# Patient Record
Sex: FEMALE | Race: WHITE | NOT HISPANIC OR LATINO
[De-identification: names, ages, dates, MRNs, and addresses within clinical notes are randomized per-mention and may not be internally consistent; named-entity substitution may affect disease eponyms.]

---

## 2019-09-23 PROBLEM — Z00.00 ENCOUNTER FOR PREVENTIVE HEALTH EXAMINATION: Status: ACTIVE | Noted: 2019-09-23

## 2019-10-02 ENCOUNTER — APPOINTMENT (OUTPATIENT)
Dept: NEUROLOGY | Facility: CLINIC | Age: 54
End: 2019-10-02
Payer: MEDICARE

## 2019-10-02 VITALS
DIASTOLIC BLOOD PRESSURE: 69 MMHG | HEIGHT: 65 IN | WEIGHT: 140 LBS | BODY MASS INDEX: 23.32 KG/M2 | HEART RATE: 59 BPM | TEMPERATURE: 98 F | SYSTOLIC BLOOD PRESSURE: 115 MMHG

## 2019-10-02 DIAGNOSIS — Z82.0 FAMILY HISTORY OF EPILEPSY AND OTHER DISEASES OF THE NERVOUS SYSTEM: ICD-10-CM

## 2019-10-02 DIAGNOSIS — Z83.3 FAMILY HISTORY OF DIABETES MELLITUS: ICD-10-CM

## 2019-10-02 DIAGNOSIS — Z83.79 FAMILY HISTORY OF OTHER DISEASES OF THE DIGESTIVE SYSTEM: ICD-10-CM

## 2019-10-02 DIAGNOSIS — Z82.49 FAMILY HISTORY OF ISCHEMIC HEART DISEASE AND OTHER DISEASES OF THE CIRCULATORY SYSTEM: ICD-10-CM

## 2019-10-02 DIAGNOSIS — Z87.820 PERSONAL HISTORY OF TRAUMATIC BRAIN INJURY: ICD-10-CM

## 2019-10-02 DIAGNOSIS — Z78.9 OTHER SPECIFIED HEALTH STATUS: ICD-10-CM

## 2019-10-02 PROCEDURE — 99205 OFFICE O/P NEW HI 60 MIN: CPT

## 2019-10-02 RX ORDER — B-COMPLEX WITH VITAMIN C
TABLET ORAL
Refills: 0 | Status: ACTIVE | COMMUNITY

## 2019-10-02 RX ORDER — MULTIVITAMIN
TABLET ORAL
Refills: 0 | Status: ACTIVE | COMMUNITY

## 2019-10-02 NOTE — REVIEW OF SYSTEMS
[Dizziness] : dizziness [Tension Headache] : tension-type headaches [Anxiety] : anxiety [Depression] : depression [Diarrhea] : diarrhea [Heartburn] : heartburn [Joint Pain] : joint pain [Negative] : Endocrine [FreeTextEntry2] : weakness,night time sweats [de-identified] : frequent falls,off balance [FreeTextEntry5] : leg swelling  [FreeTextEntry8] : frequent urination,hot flashes [FreeTextEntry9] : muscle aches

## 2019-10-02 NOTE — PHYSICAL EXAM
[FreeTextEntry1] : Physical examination \par General: No acute distress, Awake, Alert.   \par Fundus: disc margins sharp.   \par Neck: no Carotid bruit.   \par Cardiovascular: Normal rate, Regular rhythm, No murmur.  \par \par Mental status \par MoCA\par October 2, 2019 - 21/30; memory 2/5\par \par \par Cranial Nerves \par II: VFF  \par III, IV, VI: PERRL, EOMI.   \par V: Facial sensation is normal B/L.   \par VII: Facial strength is normal B/L. \par VIII: Gross hearing is intact.   \par IX, X: Palate is midline and elevates symmetrically.   \par XI: Trapezius normal strength.   \par XII: Tongue midline without atrophy or fasciculations. \par \par Motor exam  \par Muscle tone - spasticity in the right arm and leg.  Very slow and clumsy rapid successive movements in the right hand.\par No atrophy or fasciculations \par Muscle Strength: arms and legs, proximal and distal flexors and extensors are normal except for right: Finger extension 4-; ankle and toe movement 0; others on the right 4—4+\par \par \par Reflexes \par Right: 3, brachioradialis and biceps with spread to the finger flexors.\par Left 2\par Plantars right: Upgoing.   \par Plantars left: mute.   \par \par Coordination \par Finger to nose: Normal.  \par Heel to shin: Normal.   \par \par Slow, no ataxia - FNF, EDDIE, HKS on the right. \par \par Sensory \par Intact sensation to vibration and cold.\par \par Gait \par Right spastic, hemiparetic gait. Slow, tentative. \par

## 2019-10-02 NOTE — HISTORY OF PRESENT ILLNESS
[FreeTextEntry1] : Ms. Magallon is a 54 year old woman with a history of cerebral palsy with baseline mild cognitive impairment and spastic right hemiparesis. She has noticed gradually worsening cognitive and memory problems for the past 4-5 years. At the current time she forgets things that she was told 5 minutes before. She works as a  at “[employer]" and is able to perform her duties there although her manager has noticed the memory difficulty.  She has always had right-sided weakness and stiffness but there has been an increased amount of stiffness in the right side more recently. She takes baclofen 10 mg t.i.d. Her right foot is dragging and she sometimes trips on her toes on the right. She has had multiple falls. She has no history of epilepsy. She has a history of headache which was very well controlled on Topamax 75 mg at night. More recently the headaches have increased in frequency again.  Her mother has a history of cerebral aneurysm.

## 2019-10-02 NOTE — ASSESSMENT
[FreeTextEntry1] : Ms. Magallon is a 54 year old woman with a history of cerebral palsy with several issues.\par \par History of cerebral palsy with spastic right hemiparesis. She has had increased spasticity in the right arm or leg. I recommend a trial of increasing the dose of baclofen to 15 mg t.i.d.  She has had increased difficulty with her gait and has fallen. Physical medicine and rehabilitation consultation and physical therapy for gait and balance training and fall prevention.  I encouraged her to use an assistive device, at least a cane.\par \par Family history of cerebral aneurysm - MRA had.\par \par History of headache which was well controlled in the past on Topamax - now with increased severity and frequency of headaches. Increase Topamax from 75 to 100 mg daily in the evening.\par \par She has always had some mild cognitive impairment but her memory has worsened over the past 4-5 years.\par MRI brain\par Neuropsychological evaluation\par Vitamin B12 level.\par \par

## 2019-10-02 NOTE — REASON FOR VISIT
[Acute] : an acute visit [FreeTextEntry1] : Cerebral palsy,headache,history of concussions,memory problem,off balance,she has fallen

## 2019-10-02 NOTE — CONSULT LETTER
[Dear  ___] : Dear  [unfilled], [FreeTextEntry1] : I had the pleasure of evaluating your patient, ROSA ACHARYA. Please see the assessment section below for a summary of my diagnostic impression and plan.\par \par Thank you very much for allowing me to participate in the care of this patient. If you have any questions, please do not hesitate to contact me. \par \par Sincerely,\par \par Deneen Ledesma MD\par

## 2019-10-30 ENCOUNTER — RESULT REVIEW (OUTPATIENT)
Age: 54
End: 2019-10-30

## 2019-11-01 ENCOUNTER — RESULT REVIEW (OUTPATIENT)
Age: 54
End: 2019-11-01

## 2020-01-07 ENCOUNTER — APPOINTMENT (OUTPATIENT)
Dept: NEUROLOGY | Facility: CLINIC | Age: 55
End: 2020-01-07

## 2020-02-20 ENCOUNTER — APPOINTMENT (OUTPATIENT)
Dept: NEUROLOGY | Facility: CLINIC | Age: 55
End: 2020-02-20
Payer: MEDICARE

## 2020-02-20 PROCEDURE — 95819 EEG AWAKE AND ASLEEP: CPT

## 2020-02-21 PROCEDURE — 95719 EEG PHYS/QHP EA INCR W/O VID: CPT

## 2020-02-21 PROCEDURE — 95708 EEG WO VID EA 12-26HR UNMNTR: CPT

## 2020-02-21 PROCEDURE — 95700 EEG CONT REC W/VID EEG TECH: CPT

## 2020-02-25 ENCOUNTER — APPOINTMENT (OUTPATIENT)
Dept: NEUROLOGY | Facility: CLINIC | Age: 55
End: 2020-02-25

## 2020-03-06 ENCOUNTER — APPOINTMENT (OUTPATIENT)
Dept: NEUROLOGY | Facility: CLINIC | Age: 55
End: 2020-03-06
Payer: MEDICARE

## 2020-03-06 VITALS
BODY MASS INDEX: 23.32 KG/M2 | HEIGHT: 65 IN | WEIGHT: 140 LBS | DIASTOLIC BLOOD PRESSURE: 43 MMHG | SYSTOLIC BLOOD PRESSURE: 87 MMHG | HEART RATE: 76 BPM

## 2020-03-06 PROCEDURE — 99214 OFFICE O/P EST MOD 30 MIN: CPT

## 2020-03-06 NOTE — REVIEW OF SYSTEMS
[Dizziness] : dizziness [Tension Headache] : tension-type headaches [Anxiety] : anxiety [Depression] : depression [Diarrhea] : diarrhea [Heartburn] : heartburn [Joint Pain] : joint pain [Negative] : Heme/Lymph [FreeTextEntry2] : weakness,night time sweats [de-identified] : frequent falls,off balance [FreeTextEntry5] : leg swelling  [FreeTextEntry8] : frequent urination,hot flashes [FreeTextEntry9] : muscle aches

## 2020-03-06 NOTE — CONSULT LETTER
[Dear  ___] : Dear  [unfilled], [FreeTextEntry1] : I had the pleasure of evaluating your patient, ROSA ACHARYA, Please see the assessment section below for a summary of my diagnostic impression and plan.\par \par Thank you very much for allowing me to participate in the care of this patient. If you have any questions, please do not hesitate to contact me.\par \par Sincerely,\par \par Deneen Ledesma MD\par \par \par

## 2020-03-06 NOTE — ASSESSMENT
[FreeTextEntry1] : Ms. Magallon is a 54 year old woman with a history of cerebral palsy with several issues.\par \par History of cerebral palsy with spastic right hemiparesis. She has had spasticity in the right arm and leg. Continue baclofen 15 mg t.i.d.  She has had increased difficulty with her gait and has fallen. Physical medicine and rehabilitation consultation and physical therapy for gait and balance training and fall prevention.  I encouraged her to use an assistive device, at least a cane.\par \par Family history of cerebral aneurysm - no aneurysm on MRA head in October 2019.\par \par History of headache - continue Topamax 100 mg daily in the evening.\par \par She has always had some mild cognitive impairment but her memory has worsened over the past 4-5 years.\par Neuropsychological evaluation\par Vitamin B12 level.\par \par MRI brain - Prominent pituitary gland. She saw an endocrinologist; MRI pituitary\par \par D/W patient \par

## 2020-03-06 NOTE — DATA REVIEWED
[de-identified] : MRI brain reviewed\par MRA head - no aneurysm [de-identified] : Feb 2020 - Routine EEG & Ambulatory EEG reviewed

## 2020-03-06 NOTE — HISTORY OF PRESENT ILLNESS
[FreeTextEntry1] : This is a followup visit for Ms. Magallon. She continues to have lightheadedness. She is worsening stiffness in her right side with multiple falls. She used to have a power chair. Now she is not using a cane or walker. No change in any of her other symptoms\par \par Initial consultation:\par Ms. Magallon is a 54 year old woman with a history of cerebral palsy with baseline mild cognitive impairment and spastic right hemiparesis. She has noticed gradually worsening cognitive and memory problems for the past 4-5 years. At the current time she forgets things that she was told 5 minutes before. She works as a  at “[employer]" and is able to perform her duties there although her manager has noticed the memory difficulty.  She has always had right-sided weakness and stiffness but there has been an increased amount of stiffness in the right side more recently. She takes baclofen 10 mg t.i.d. Her right foot is dragging and she sometimes trips on her toes on the right. She has had multiple falls. She has no history of epilepsy. She has a history of headache which was very well controlled on Topamax 75 mg at night. More recently the headaches have increased in frequency again.  Her mother has a history of cerebral aneurysm.

## 2020-03-06 NOTE — PHYSICAL EXAM
[FreeTextEntry1] : Physical examination \par \par \par Mental status \par Awake, alert, appropriate.  Gives detailed history.\par \par MoCA\par October 2, 2019 - 21/30; memory 2/5\par  \par \par Motor exam  \par Muscle tone - spasticity in the right arm and leg.  Very slow and clumsy rapid successive movements in the right hand.\par No atrophy or fasciculations \par Muscle Strength: arms and legs, proximal and distal flexors and extensors are normal except for right: Finger extension 4-; ankle and toe movement 0; others on the right 4—>4+\par \par \par Reflexes \par Right: 3, brachioradialis and biceps with spread to the finger flexors.\par Left 2\par Plantars right: Upgoing.   \par Plantars left: mute.   \par \par \par Slow, no ataxia - FNF, EDDIE, HKS on the right. \par \par Gait \par Right spastic, hemiparetic gait. Slow, tentative. \par

## 2020-03-23 ENCOUNTER — NON-APPOINTMENT (OUTPATIENT)
Age: 55
End: 2020-03-23

## 2020-06-30 ENCOUNTER — RESULT REVIEW (OUTPATIENT)
Age: 55
End: 2020-06-30

## 2020-07-27 ENCOUNTER — APPOINTMENT (OUTPATIENT)
Dept: NEUROLOGY | Facility: CLINIC | Age: 55
End: 2020-07-27
Payer: MEDICARE

## 2020-07-27 VITALS
DIASTOLIC BLOOD PRESSURE: 57 MMHG | BODY MASS INDEX: 24.49 KG/M2 | SYSTOLIC BLOOD PRESSURE: 101 MMHG | HEIGHT: 65 IN | WEIGHT: 147 LBS | HEART RATE: 78 BPM

## 2020-07-27 DIAGNOSIS — R32 UNSPECIFIED URINARY INCONTINENCE: ICD-10-CM

## 2020-07-27 DIAGNOSIS — R15.9 FULL INCONTINENCE OF FECES: ICD-10-CM

## 2020-07-27 PROCEDURE — 99214 OFFICE O/P EST MOD 30 MIN: CPT

## 2020-07-27 RX ORDER — TOPIRAMATE 100 MG/1
100 TABLET, FILM COATED ORAL
Qty: 90 | Refills: 3 | Status: DISCONTINUED | COMMUNITY
Start: 2019-10-02 | End: 2020-07-27

## 2020-07-27 NOTE — DATA REVIEWED
[de-identified] : MRI brain reviewed\par MRA head - no aneurysm [de-identified] : Feb 2020 - Routine EEG & Ambulatory EEG reviewed

## 2020-07-27 NOTE — REASON FOR VISIT
[Follow-Up: _____] : a [unfilled] follow-up visit [FreeTextEntry1] : Balance issues, headaches, pain in right leg.

## 2020-07-27 NOTE — HISTORY OF PRESENT ILLNESS
[FreeTextEntry1] : Kavita Magallon is a 55 year old woman with cerebral palsy. She presents today for worsening balance and stiffness in the right leg, as well as headaches.\par \par She currently lives on the third floor with no elevator.  She does not use a cane or assistive device to help her walk. Over the past four months, Ms. Magallon reports a change in balance, increase in difficulty walking and stiffness to her right leg.  She is currently taking Baclofen 10mg three times per day with no side effects. Baclofen 15mg three times per day was recommended on her last visit.  There is pain in her right leg from the hip throughout the whole leg. She reports the pain is difficult to describe but it feels as "if somebody is hitting her leg."  \par \par Ms. Magallon also reports a worsening of her preexisting headaches. She was on Topamax 100mg nightly that was tapered down by her primary doctor - currently, she reports taking 12.5mg of Topamax nightly.  She is not certain of the increased in the amount of frequency but is aware that the headaches increased overall.  The pain starts in the occipital area and travels to the bilateral frontal area of her head.  She has sound sensitivity and describes it as a "device" on her head.  She denies photophobia, nausea, vomiting.  Ms. Magallon has tried Ibuprofen and Naproxen in the past with no relief. She was given butalbital by another physician.  This has not provided her with any relief.  \par \par She reports intermittent urinary and fecal incontinence for at least one year. \par \par Ms. Magallon also reports worsening memory over 4-5 years.  She was unable to do neuropsychological testing due to insurance issues.  \par \par The remaining neurological review of systems is negative. \par \par 3/6/20: This is a followup visit for Ms. Magallon. She continues to have lightheadedness. She is worsening stiffness in her right side with multiple falls. She used to have a power chair. Now she is not using a cane or walker. No change in any of her other symptoms\par \par Initial consultation:\par Ms. Magallon is a 54 year old woman with a history of cerebral palsy with baseline mild cognitive impairment and spastic right hemiparesis. She has noticed gradually worsening cognitive and memory problems for the past 4-5 years. At the current time she forgets things that she was told 5 minutes before. She works as a  at “[employer]" and is able to perform her duties there although her manager has noticed the memory difficulty.  She has always had right-sided weakness and stiffness but there has been an increased amount of stiffness in the right side more recently. She takes baclofen 10 mg t.i.d. Her right foot is dragging and she sometimes trips on her toes on the right. She has had multiple falls. She has no history of epilepsy. She has a history of headache which was very well controlled on Topamax 75 mg at night. More recently the headaches have increased in frequency again.  Her mother has a history of cerebral aneurysm.

## 2020-07-27 NOTE — PHYSICAL EXAM
[FreeTextEntry1] : Physical examination \par \par \par Mental status \par Awake, alert, appropriate.  Gives detailed history.\par \par MoCA\par October 2, 2019 - 21/30; memory 2/5\par  \par \par Motor exam  \par Muscle tone - spasticity in the right arm and leg.  Very slow and clumsy rapid successive movements in the right hand.\par No atrophy or fasciculations \par Muscle Strength: arms and legs, proximal and distal flexors and extensors are normal except for right: Finger extension 4-; ankle and toe movement 0; others on the right 4—>4+\par \par \par Reflexes \par Right: 3, brachioradialis and biceps with spread to the finger flexors.\par Right patellar 4+\par Right achilles 4+\par Left 2\par \par Plantars right: Upgoing.   \par Plantars left: mute.   \par \par \par Slow, no ataxia - FNF, EDDIE, HKS on the right. \par \par Gait \par Right spastic, hemiparetic gait. Slow, tentative. \par

## 2020-07-27 NOTE — ASSESSMENT
[FreeTextEntry1] : Ms. Magallon is a 55 year old woman with a history of cerebral palsy with several issues.\par \par History of cerebral palsy with spastic right hemiparesis.\par  She has had increased spasticity in the right arm and leg.\par  Increase to baclofen 15 mg t.i.d for two weeks then increase to Baclofen 20mg TID.\par \par  She has had increased difficulty with her gait and has fallen. Physical medicine and rehabilitation consultation and physical therapy for gait and balance training and fall prevention. \par  I encouraged her to use an assistive device, at least a cane.\par \par Family history of cerebral aneurysm - no aneurysm on MRA head in October 2019.\par \par Increased frequency of migraines - stop Topamax and Butabitol. Start Imitrex 100mg PRN.  On next visit, we will discuss preventative medications.  \par \par She has always had some mild cognitive impairment but her memory has worsened over the past 4-5 years.\par Neuropsychological evaluation.\par Vitamin B12 level.\par \par MRI - Prominent pituitary gland with increased size differential diagnosis includes pituitary adenoma. She saw an endocrinologist; referral to neurosurgery. \par \par Intermittent urinary and fecal incontinence for at least one year - urogynecology consult. \par \par \par Follow up in one month.

## 2020-08-12 ENCOUNTER — RESULT REVIEW (OUTPATIENT)
Age: 55
End: 2020-08-12

## 2020-09-01 ENCOUNTER — APPOINTMENT (OUTPATIENT)
Dept: NEUROLOGY | Facility: CLINIC | Age: 55
End: 2020-09-01
Payer: MEDICARE

## 2020-09-01 VITALS
SYSTOLIC BLOOD PRESSURE: 94 MMHG | HEART RATE: 78 BPM | HEIGHT: 65 IN | DIASTOLIC BLOOD PRESSURE: 63 MMHG | WEIGHT: 144 LBS | BODY MASS INDEX: 23.99 KG/M2 | TEMPERATURE: 95.3 F

## 2020-09-01 DIAGNOSIS — R51 HEADACHE: ICD-10-CM

## 2020-09-01 PROCEDURE — 99214 OFFICE O/P EST MOD 30 MIN: CPT

## 2020-09-01 RX ORDER — SUMATRIPTAN 100 MG/1
100 TABLET, FILM COATED ORAL
Qty: 9 | Refills: 5 | Status: DISCONTINUED | COMMUNITY
Start: 2020-07-27 | End: 2020-09-01

## 2020-09-01 NOTE — PHYSICAL EXAM
[FreeTextEntry1] : Physical examination \par \par \par Mental status \par Awake, alert, appropriate.  Gives detailed history.\par \par MoCA\par October 2, 2019 - 21/30; memory 2/5\par  unable to complete MOCA today due to headache. \par \par Motor exam  \par Muscle tone - spasticity in the right arm and leg.  Very slow and clumsy rapid successive movements in the right hand.\par No atrophy or fasciculations \par Muscle Strength: arms and legs, proximal and distal flexors and extensors are normal except for right: Finger extension 4-; ankle and toe movement 0; others on the right 4—>4+\par \par \par Reflexes \par Right: 3, brachioradialis and biceps with spread to the finger flexors.\par Right patellar 4+\par Right achilles 4+\par Left 2\par \par Plantars right: Upgoing.   \par Plantars left: mute.   \par \par \par Slow, no ataxia - FNF, EDDIE, HKS on the right. \par \par Gait \par Right spastic, hemiparetic gait. Slow, tentative. \par

## 2020-09-01 NOTE — HISTORY OF PRESENT ILLNESS
[FreeTextEntry1] : Kavita Magallon is a 55 year old woman with cerebral palsy presenting for a follow up for worsening balance and headaches. \par \par She has had headaches five times per week that last all day. She reports the Imitrex helps but it makes her feel fatigued and sleepy.   Ms. Magallno endorses that the pain starts in the occipital and neck area and radiating to the frontal and bitemporal. It is 10/10 on a pain scale and feels as if  a "knife is through her temple."\par \par She also reports her balance is worse. Physical therapy was ordered for her previously and she starts it on September 9th.  She feels dizzy and lightheaded at times. There have been no falls or injuries. She has 36 stairs in her apartment and plans on seeing rehab medicine on September 10th.  \par She does not go outside of the house much for a fear of falling.  \par \par Ms. Magallon has had nausea for 4-5 days constant with no change in medication. She has not followed up with her PCP yet for this. \par \par Ms. Magallon has not followed up with the behavioral health center yet for increased stress and anxiety.\par \par Currently, she is on baclofen 15mg three times per day. Her spasms have improved on the right side. She denies any side effects with the baclofen. \par \par She reports ongoing difficulty with her memory and has a planned neuropsychological evaluation. She did not get her B12 level done yet. \par \par The remaining neurological review of systems is negative. \par \par 7/27/20\par Kavita Magallon is a 55 year old woman with cerebral palsy. She presents today for worsening balance and stiffness in the right leg, as well as headaches.\par \par She currently lives on the third floor with no elevator.  She does not use a cane or assistive device to help her walk. Over the past four months, Ms. Magallon reports a change in balance, increase in difficulty walking and stiffness to her right leg.  She is currently taking Baclofen 10mg three times per day with no side effects. Baclofen 15mg three times per day was recommended on her last visit.  There is pain in her right leg from the hip throughout the whole leg. She reports the pain is difficult to describe but it feels as "if somebody is hitting her leg."  \par \par Ms. Magallon also reports a worsening of her preexisting headaches. She was on Topamax 100mg nightly that was tapered down by her primary doctor - currently, she reports taking 12.5mg of Topamax nightly.  She is not certain of the increased in the amount of frequency but is aware that the headaches increased overall.  The pain starts in the occipital area and travels to the bilateral frontal area of her head.  She has sound sensitivity and describes it as a "device" on her head.  She denies photophobia, nausea, vomiting.  Ms. Magallon has tried Ibuprofen and Naproxen in the past with no relief. She was given butalbital by another physician.  This has not provided her with any relief.  \par \par She reports intermittent urinary and fecal incontinence for at least one year. \par \par Ms. Magallon also reports worsening memory over 4-5 years.  She was unable to do neuropsychological testing due to insurance issues.  \par \par The remaining neurological review of systems is negative. \par \par 3/6/20: This is a followup visit for Ms. Magallon. She continues to have lightheadedness. She is worsening stiffness in her right side with multiple falls. She used to have a power chair. Now she is not using a cane or walker. No change in any of her other symptoms\par \par Initial consultation:\par Ms. Magallon is a 54 year old woman with a history of cerebral palsy with baseline mild cognitive impairment and spastic right hemiparesis. She has noticed gradually worsening cognitive and memory problems for the past 4-5 years. At the current time she forgets things that she was told 5 minutes before. She works as a  at “[employer]" and is able to perform her duties there although her manager has noticed the memory difficulty.  She has always had right-sided weakness and stiffness but there has been an increased amount of stiffness in the right side more recently. She takes baclofen 10 mg t.i.d. Her right foot is dragging and she sometimes trips on her toes on the right. She has had multiple falls. She has no history of epilepsy. She has a history of headache which was very well controlled on Topamax 75 mg at night. More recently the headaches have increased in frequency again.  Her mother has a history of cerebral aneurysm.

## 2020-09-01 NOTE — DATA REVIEWED
[de-identified] : MRI brain reviewed\par MRA head - no aneurysm [de-identified] : Feb 2020 - Routine EEG & Ambulatory EEG reviewed

## 2020-09-01 NOTE — ASSESSMENT
[FreeTextEntry1] : Ms. Magallon is a 55 year old woman with a history of cerebral palsy with several issues.\par \par History of cerebral palsy with spastic right hemiparesis.\par She has had increased spasticity in the right arm and leg which has improved with baclofen 15 mg TID but still present - increase baclofen to 20mg three times per day.\par \par Increased frequency of migraines - Topamax and Butabitol stopped in the past. Unable to tolerate Imitrex.  \par Trial of Frova 2.5 mg PRN and ondansetron. On next visit, we will discuss preventative medications. \par \par cervicogenic component of headache- pain management referral.\par \par Nausea- follow up with PCP. PRN Zofran with migraine \par \par  She has had increased difficulty with her gait and has fallen. Physical medicine and rehabilitation consultation and physical therapy for gait and balance training and fall prevention. \par  I encouraged her to use an assistive device, at least a cane.\par MRI Cervical spine without contrast to rule out a cervical spondylotic myelopathy \par \par Family history of cerebral aneurysm - no aneurysm on MRA head in October 2019.\par \par She has always had some mild cognitive impairment but her memory has worsened over the past 4-5 years.\par Neuropsychological evaluation.\par Vitamin B12 level.\par \par MRI - Prominent pituitary gland with increased size differential diagnosis includes pituitary adenoma. She saw an endocrinologist; referral to neurosurgery. \par \par Intermittent urinary and fecal incontinence for at least one year - urogynecology consult on last visit. \par \par Follow up in 6 weeks. \par

## 2020-09-09 ENCOUNTER — APPOINTMENT (OUTPATIENT)
Dept: NEUROSURGERY | Facility: CLINIC | Age: 55
End: 2020-09-09
Payer: MEDICARE

## 2020-09-09 DIAGNOSIS — R52 PAIN, UNSPECIFIED: ICD-10-CM

## 2020-09-10 ENCOUNTER — APPOINTMENT (OUTPATIENT)
Dept: PHYSICAL MEDICINE AND REHAB | Facility: CLINIC | Age: 55
End: 2020-09-10
Payer: MEDICARE

## 2020-09-10 VITALS
HEIGHT: 65 IN | RESPIRATION RATE: 16 BRPM | BODY MASS INDEX: 23.99 KG/M2 | HEART RATE: 67 BPM | SYSTOLIC BLOOD PRESSURE: 100 MMHG | DIASTOLIC BLOOD PRESSURE: 66 MMHG | WEIGHT: 144 LBS | TEMPERATURE: 97.6 F | OXYGEN SATURATION: 98 %

## 2020-09-10 PROCEDURE — 99204 OFFICE O/P NEW MOD 45 MIN: CPT

## 2020-09-13 PROBLEM — R52 PAIN OF RIGHT SIDE OF BODY: Status: RESOLVED | Noted: 2020-09-10 | Resolved: 2020-09-13

## 2020-09-13 NOTE — REVIEW OF SYSTEMS
[Hearing Loss] : loss of hearing [Abdominal Pain] : abdominal pain [Nausea] : nausea [Incontinence] : incontinence [Joint Stiffness] : joint stiffness [Muscle Pain] : muscle pain [Muscle Weakness] : muscle weakness [Headache] : headache [Difficulty Walking] : difficulty walking [Anxiety] : anxiety [Depression] : depression [Easy Bruising] : a tendency for easy bruising

## 2020-09-13 NOTE — SOCIAL HISTORY
[Spouse/Partner] : spouse/partner [Apartment] : in an apartment [Stairs inside the home?] : stairs inside the home [No Device Needed] : Patient doesn't use a device for ambulation

## 2020-09-14 NOTE — REVIEW OF SYSTEMS
[Eye Pain] : no eye pain [Chills] : no chills [Fever] : no fever [Palpitations] : no palpitations [Chest Pain] : no chest pain [Shortness Of Breath] : no shortness of breath [Skin Rash] : no skin rash [Cough] : no cough [Constipation] : no constipation [FreeTextEntry7] : nausea for past week

## 2020-09-14 NOTE — HISTORY OF PRESENT ILLNESS
[FreeTextEntry1] : Pt. is a 55 y.o. female with h/o CP who is referred from Dr. Ledesma for physiatry evaluation for her gait and multiple falls.  Pt. c/o right side weakness and right hip-->ankle pain from 3-6/10.  Her walking has worsened for over a year.  She uses no AD.  She falls about 1x/week.  She needs variable help with her ADL.  Pt. has started outpatient PT.  She was referred to see a neuropsychologist but hasn't gone to one yet.  She lives in a 3rd floor walkup (30 steps) with her fiance; she says he does not want to move.  Her Baclofen was recently increased for her spasms, and she also smoke marijuana for her spasms.

## 2020-09-14 NOTE — PHYSICAL EXAM
[FreeTextEntry1] : Gen: WD in NAD. HEENT: THEA, pharynx w/o lesions. Neck: tenderness posterior cervical muscles.  Lungs: decreased BS throughout. Heart: RRR, S1, S2. Abd: +BS soft, +midepigastric tenderness, ?distention. Ext: atrophy RLE, no edema, calves soft, NT.  Back mid tenderness.  Skin: scars RUE. Musculoskeletal.  RDF to neutral passively. Neuro: alert, oriented x 3. CN II-XII grossly intact except VIII.  Tone ?increased RUE/RLE.  Motor: Right shoulder abd 4/5, REF/EE 4/5, RFF 2-3/5, IRMA 0-1/5, Left shoulder abd 4+/5, LEF/EE 5-/5, LFF/IO 4/5, RHF 3-/5, RKE 4+/5, RDF/PF 0/5 vs tone, LHF 4+/5, LKE/DF/PF 5-/5.  Sensory intact to LT.  Reflexes: RUE 3+, LUE 2+, RKJ 4+, TONY 1+, LUE 2+, LKF 2+.LAJ 1+.  Babinski +/-.  0/3 objects at 5 mins, serial 7's 100-92, Q+D+N+P = 41 cents, world <->, knows president down to [place].  Gait right hemiplegic, with decreased arm swing and hip hike and foot clearance (drags foot on floor at times).

## 2020-09-14 NOTE — ASSESSMENT
[FreeTextEntry1] : 1) Pt. is a 55 y.o. female with decreased gait due to CP which has been worsening for a year.  Pt. has multiple falls.  She has difficulty with stairs.  She also has cognitive deficits which may be related to her mood. Would continue PT.  Having given her a prescription to give to PT to evaluate her for a walker.  Also have given her  a prescription for neuropsychologic evaluation with Dr. Espinosa.\par 2) Abdominal pain, nausea.  Advised pt. to contact her PCP ASAP to discuss her abdominal pain and nausea.\par Education provided, questions answered.\par 50 minutes spent with this case.

## 2020-09-15 ENCOUNTER — TRANSCRIPTION ENCOUNTER (OUTPATIENT)
Age: 55
End: 2020-09-15

## 2020-09-23 ENCOUNTER — APPOINTMENT (OUTPATIENT)
Dept: NEUROSURGERY | Facility: CLINIC | Age: 55
End: 2020-09-23
Payer: MEDICARE

## 2020-09-24 ENCOUNTER — APPOINTMENT (OUTPATIENT)
Dept: NEUROSURGERY | Facility: CLINIC | Age: 55
End: 2020-09-24
Payer: MEDICARE

## 2020-09-24 VITALS
BODY MASS INDEX: 23.99 KG/M2 | SYSTOLIC BLOOD PRESSURE: 100 MMHG | DIASTOLIC BLOOD PRESSURE: 61 MMHG | WEIGHT: 144 LBS | HEIGHT: 65 IN | HEART RATE: 78 BPM | TEMPERATURE: 98.4 F

## 2020-09-24 PROCEDURE — 99205 OFFICE O/P NEW HI 60 MIN: CPT

## 2020-09-24 NOTE — END OF VISIT
[FreeTextEntry3] : I have seen the patient and reviewed the case together with PA and I agree with the final recommendations and plan of care.\par \par Enzo Mathur MD\par Neurosurgery\par \par  [Time Spent: ___ minutes] : I have spent [unfilled] minutes of time on the encounter. [>50% of the face to face encounter time was spent on counseling and/or coordination of care for ___] : Greater than 50% of the face to face encounter time was spent on counseling and/or coordination of care for [unfilled]

## 2020-09-24 NOTE — DATA REVIEWED
[de-identified] :   [facility] MRI Report             Final  No Documents Attached   Result Annotated [id number] 04:51PM by DENEEN LEDESMA    Phone call - increased size of pituitary - ddx - pituitary adenoma. Plan Endocrine f/u Neurosurgery consult.       [address]  [address]                                        Department of Radiology                                             [phone]   Patient Name:      ROSA ACHARYA                 Location:       [facility] Rec #:        [id number]                    Account #:      [id number] YOB: 1965                    Ordering:       Deneen Ledesma MD Age: 54               Sex:    F                 Attending:      Deneen Ledesma MD PCP:        Danyell Fox MD ______________________________________________________________________________________  Exam Date:      06/30/20 Exam:         MRI BRAIN WAW IC Order#:       MRI [id number]    History: PITUITARY ABNORMALITY.    MRI brain with attention to pituitary gland with contrast 6/30/2020.   Multiplanar MR imaging of the brain and sulcal turcica obtained prior to and following intravenous ministration of contrast. Postcontrast coronal multiphasic dynamic sequence obtained. The study performed on a 1.5 Eli magnet.   Comparison made to previous MRI brain 10/30/2020   There is again mild asymmetric diminished left hemispheric frontoparietal volume loss with enlargement of the left lateral ventricle. There is again irregular thinning of the body the corpus callosum. There is asymmetric volume loss involving the left thalamus, middle cerebral peduncle and medulla.   The pituitary gland is is mildly enlarged measuring 1 cm in height with convex dome extending into the suprasellar cistern and demonstrates diffuse homogeneous enhancement. The pituitary gland a ppears slightly larger than prior study.. The findings are suspicious for a pituitary macroadenoma. Recommend correlation with endocrinologic evaluation. The normal pituitary bright spot is noted. There is no compression on the optic chiasm. The pineal increased several junction regions are unremarkable.   There is no cerebral edema. There are a few foci of T2/FLAIR hyperintensity within the periventricular white matter which may relate to small vessel ischemic disease. Diffusion imaging reveals no acute or recent infarcts. There is no hemorrhage. There are no abnormal extra-axial collections.   Postcontrast images reveals no brain parenchymal enhancing lesion. There is no abnormal leptomeningeal enhancement.   The distal internal carotid and vertebral basilar artery flow-voids are preserved.   The visualized orbits are unremarkable. There is mild ethmoid and sphenoid sinus mucosal disease.     Impression:   Mildly enlarged homogeneously enhancing 1 cm pituitary gland with convex contour extending to suprasellar cistern. Findings suspicious for small pituitary macroadenoma. Recommend correlation with endocrinological laboratory studies.   No acute or recent infarct, edema or hemorrhage.   Left hemispheric volume loss with irregular thinning of corpus callosum and asymmetric left thalamus and brainstem volume loss. Findings presumably related to old ischemic insult.    ***Electronically Signed *** ----------------------------------------------- Shakir Chopra MD              06/30/20 1551  Dictated on 06/30/20 1403   Report cc:  Deneen Ledesma MD; Ladonna Quintanilla MD;      Ordered by: DENEEN LEDESMA       Collected/Examined: 30Jun2020 01:00PM        Verified by: DENEEN LEDESMA 01Jul2020 04:51PM         Result Communication: Discussed results with patient; Stage: Final         Performed at: [facility]       Resulted: 30Jun2020 02:03PM       Last Updated: 01Jul2020 04:51PM       Accession: [id number]

## 2020-09-24 NOTE — PHYSICAL EXAM
[Person] : oriented to person [Place] : oriented to place [Time] : oriented to time [Concentration Intact] : normal concentrating ability [Fluency] : fluency intact [Comprehension] : comprehension intact [I: Normal Smell] : smell intact bilaterally [Cranial Nerves Optic (II)] : visual acuity intact bilaterally,  pupils equal round and reactive to light [Cranial Nerves Oculomotor (III)] : extraocular motion intact [Cranial Nerves Trigeminal (V)] : facial sensation intact symmetrically [Cranial Nerves Facial (VII)] : face symmetrical [Cranial Nerves Vestibulocochlear (VIII)] : hearing was intact bilaterally [Cranial Nerves Glossopharyngeal (IX)] : tongue and palate midline [Cranial Nerves Accessory (XI - Cranial And Spinal)] : head turning and shoulder shrug symmetric [Cranial Nerves Hypoglossal (XII)] : there was no tongue deviation with protrusion [Sensation Tactile Decrease] : light touch was intact [FreeTextEntry5] : no field cut [FreeTextEntry6] : +spasticity right arm and leg , slow, clumsy ambulates with difficulty UE,LE 4/5

## 2020-09-24 NOTE — HISTORY OF PRESENT ILLNESS
[de-identified] : Ms Magallon is a 55 YOF who comes to the office today for neurosurgical evaluation and imaging review of possible pituitary macroadenoma. Patient is being treated by Dr Ledesma who referred the patient to our office who has a past medical history for cerebral palsy, spastic right hemiparesis, balance issues and headaches. MRI+/- brain 6/30/20 showed a mildly enlarged homogenously enhancing 1 cm pituitary gland with convex contour extending suprasellar cistern. She denies visual changes, galactorrhea, enlargement of hands and feet. She saw an Endocrinologist Dr Ladonna Quintanilla a few months ago in [place].

## 2020-09-24 NOTE — ASSESSMENT
[FreeTextEntry1] : I have discussed the natural history and treatment options for secreting and non-secreting pituitary adenomas with the patient . I explained the indications for observation and imaging surveillance, radiation therapy, radiosurgery and surgery done through an endoscopic endonasal transsphenoidal approach. I explained the indications of a combination of these treatment options. I discussed the risks, benefits, possible complications and expected outcome related to each treatment option. In the end my recommendation is for neuroophthalmology assessment and visual field studies and endocrinology consult for pituitary hormonal work up. Patient understood and agreed with our plan of care and decided to move forward with it. All questions were answered.\par \par

## 2020-10-27 ENCOUNTER — APPOINTMENT (OUTPATIENT)
Dept: NEUROLOGY | Facility: CLINIC | Age: 55
End: 2020-10-27
Payer: MEDICARE

## 2020-10-27 VITALS
DIASTOLIC BLOOD PRESSURE: 71 MMHG | HEIGHT: 65 IN | BODY MASS INDEX: 26.33 KG/M2 | TEMPERATURE: 97.2 F | SYSTOLIC BLOOD PRESSURE: 105 MMHG | WEIGHT: 158 LBS | HEART RATE: 76 BPM

## 2020-10-27 PROCEDURE — 99072 ADDL SUPL MATRL&STAF TM PHE: CPT

## 2020-10-27 PROCEDURE — 99215 OFFICE O/P EST HI 40 MIN: CPT

## 2020-10-28 NOTE — DATA REVIEWED
[de-identified] : MRI brain reviewed\par MRA head - no aneurysm [de-identified] : Feb 2020 - Routine EEG & Ambulatory EEG reviewed [de-identified] : 9/3/20-see result note.

## 2020-10-28 NOTE — ASSESSMENT
[FreeTextEntry1] : Ms. Magallon is a 55 year old woman with a history of cerebral palsy with several issues.\par \par History of cerebral palsy with spastic right hemiparesis.\par She previously had increased spasticity in the right arm and leg which improved with baclofen - continue 20mg three times per day.\par \par Increased frequency of migraines - Topamax and Butabitol stopped in the past. Unable to tolerate Imitrex and Frova not covered by insurance. Continue Rizatriptan PRN which is helping. \par On next visit, we will discuss preventative medications. \par \par Cervicogenic component of headache- pain management referral.\par \par Nausea- follow up with PCP. PRN Zofran with migraine \par \par She has had improvement in her gait and no further falls with right AFO, PT and now using a walker.   \par \par Family history of cerebral aneurysm - no aneurysm on MRA head in October 2019.\par \par She has always had some mild cognitive impairment but her memory has worsened over the past 4-5 years.\par Neuropsychological evaluation reviewed.\par Serological workup for other causes.\par \par MRI - Prominent pituitary gland with increased size differential diagnosis includes pituitary adenoma. She saw an endocrinologist; referral to neurosurgery. \par \par Intermittent urinary and fecal incontinence for at least one year - urogynecology consult on last visit. \par \par Follow up in 3 months. \par

## 2020-10-28 NOTE — HISTORY OF PRESENT ILLNESS
[FreeTextEntry1] : Kavita Magallon is a 55 year old woman with cerebral palsy presenting for a follow up appointment to review her neuropsychological evaluation results. \par \par She recently started physical therapy and is using a Right AFO and walker. She now feels more comfortable when walking and has not had any falls.\par \par She endorses ongoing memory problems. Her fiance helps with medications and bills. \par \par Overall, she reports her headaches are now under control with Rizatriptan.  \par \par The remaining neurological review of systems is negative. \par \par 9/1/20\par Kavita Magallon is a 55 year old woman with cerebral palsy presenting for a follow up for worsening balance and headaches. \par \par She has had headaches five times per week that last all day. She reports the Imitrex helps but it makes her feel fatigued and sleepy.   Ms. Magallon endorses that the pain starts in the occipital and neck area and radiating to the frontal and bitemporal. It is 10/10 on a pain scale and feels as if  a "knife is through her temple."\par \par She also reports her balance is worse. Physical therapy was ordered for her previously and she starts it on September 9th.  She feels dizzy and lightheaded at times. There have been no falls or injuries. She has 36 stairs in her apartment and plans on seeing rehab medicine on September 10th.  \par She does not go outside of the house much for a fear of falling.  \par \par Ms. Magallon has had nausea for 4-5 days constant with no change in medication. She has not followed up with her PCP yet for this. \par \par Ms. Magallon has not followed up with the behavioral health center yet for increased stress and anxiety.\par \par Currently, she is on baclofen 15mg three times per day. Her spasms have improved on the right side. She denies any side effects with the baclofen. \par \par She reports ongoing difficulty with her memory and has a planned neuropsychological evaluation. She did not get her B12 level done yet. \par \par The remaining neurological review of systems is negative. \par \par 7/27/20\par Kavita Magallon is a 55 year old woman with cerebral palsy. She presents today for worsening balance and stiffness in the right leg, as well as headaches.\par \par She currently lives on the third floor with no elevator.  She does not use a cane or assistive device to help her walk. Over the past four months, Ms. Magallon reports a change in balance, increase in difficulty walking and stiffness to her right leg.  She is currently taking Baclofen 10mg three times per day with no side effects. Baclofen 15mg three times per day was recommended on her last visit.  There is pain in her right leg from the hip throughout the whole leg. She reports the pain is difficult to describe but it feels as "if somebody is hitting her leg."  \par \par Ms. Magallon also reports a worsening of her preexisting headaches. She was on Topamax 100mg nightly that was tapered down by her primary doctor - currently, she reports taking 12.5mg of Topamax nightly.  She is not certain of the increased in the amount of frequency but is aware that the headaches increased overall.  The pain starts in the occipital area and travels to the bilateral frontal area of her head.  She has sound sensitivity and describes it as a "device" on her head.  She denies photophobia, nausea, vomiting.  Ms. Magallon has tried Ibuprofen and Naproxen in the past with no relief. She was given butalbital by another physician.  This has not provided her with any relief.  \par \par She reports intermittent urinary and fecal incontinence for at least one year. \par \par Ms. Magallon also reports worsening memory over 4-5 years.  She was unable to do neuropsychological testing due to insurance issues.  \par \par The remaining neurological review of systems is negative. \par \par 3/6/20: This is a followup visit for Ms. Magallon. She continues to have lightheadedness. She is worsening stiffness in her right side with multiple falls. She used to have a power chair. Now she is not using a cane or walker. No change in any of her other symptoms\par \par Initial consultation:\par Ms. Magallon is a 54 year old woman with a history of cerebral palsy with baseline mild cognitive impairment and spastic right hemiparesis. She has noticed gradually worsening cognitive and memory problems for the past 4-5 years. At the current time she forgets things that she was told 5 minutes before. She works as a  at “[employer]" and is able to perform her duties there although her manager has noticed the memory difficulty.  She has always had right-sided weakness and stiffness but there has been an increased amount of stiffness in the right side more recently. She takes baclofen 10 mg t.i.d. Her right foot is dragging and she sometimes trips on her toes on the right. She has had multiple falls. She has no history of epilepsy. She has a history of headache which was very well controlled on Topamax 75 mg at night. More recently the headaches have increased in frequency again.  Her mother has a history of cerebral aneurysm.

## 2020-11-04 ENCOUNTER — APPOINTMENT (OUTPATIENT)
Dept: PULMONOLOGY | Facility: CLINIC | Age: 55
End: 2020-11-04

## 2021-01-22 ENCOUNTER — APPOINTMENT (OUTPATIENT)
Dept: PULMONOLOGY | Facility: HOSPITAL | Age: 56
End: 2021-01-22
Payer: MEDICARE

## 2021-01-22 VITALS — WEIGHT: 158 LBS | BODY MASS INDEX: 26.33 KG/M2 | HEIGHT: 65 IN

## 2021-01-22 DIAGNOSIS — F17.200 NICOTINE DEPENDENCE, UNSPECIFIED, UNCOMPLICATED: ICD-10-CM

## 2021-01-22 PROCEDURE — 99204 OFFICE O/P NEW MOD 45 MIN: CPT | Mod: 95

## 2021-01-22 RX ORDER — SERTRALINE 25 MG/1
TABLET, FILM COATED ORAL
Refills: 0 | Status: DISCONTINUED | COMMUNITY
End: 2021-01-22

## 2021-01-22 RX ORDER — ONDANSETRON 4 MG/1
4 TABLET ORAL
Qty: 20 | Refills: 1 | Status: DISCONTINUED | COMMUNITY
Start: 2020-09-01 | End: 2021-01-22

## 2021-01-22 RX ORDER — ESOMEPRAZOLE MAGNESIUM 40 MG/1
CAPSULE, DELAYED RELEASE ORAL
Refills: 0 | Status: ACTIVE | COMMUNITY

## 2021-01-22 RX ORDER — PREDNISOLONE 5 MG/1
TABLET ORAL
Refills: 0 | Status: DISCONTINUED | COMMUNITY
End: 2021-01-22

## 2021-01-22 NOTE — REASON FOR VISIT
[Home] : at home, [unfilled] , at the time of the visit. [Medical Office: ([facility])___] : at the medical office located in  [Verbal consent obtained from patient] : the patient, [unfilled] [Initial Evaluation] : an initial evaluation [FreeTextEntry1] : sleep apnea

## 2021-01-22 NOTE — HISTORY OF PRESENT ILLNESS
[FreeTextEntry1] : Dr. Danyell Fox\par Dr. Ledesma\par 55 year old woman  with history of cerebral palsy, forgetfullness, pituatary mass is here in the sleep center to address excessive snoring and restless sleep.  Patient is sleepy with [place] sleepiness score of 14.  Patient has very loud snoring which disturbs her fiance, also has witnessed apneas and choking sensations.  Patient's bedtime is around 9.30-10 PM wakes up in the morning around 7.30 AM. She feels tired when she wakes up.  Patient drinks 2 cups of coffee and 2 glass of soda during the daytime , patient has headaches. Patient has nocturia atleast twice at night.  She does not drive.\par [place] score - 5 (high risk for apnea)\par

## 2021-01-27 ENCOUNTER — APPOINTMENT (OUTPATIENT)
Dept: NEUROLOGY | Facility: CLINIC | Age: 56
End: 2021-01-27
Payer: MEDICARE

## 2021-01-27 VITALS — DIASTOLIC BLOOD PRESSURE: 63 MMHG | SYSTOLIC BLOOD PRESSURE: 93 MMHG | HEART RATE: 96 BPM

## 2021-01-27 VITALS — DIASTOLIC BLOOD PRESSURE: 60 MMHG | SYSTOLIC BLOOD PRESSURE: 99 MMHG | HEART RATE: 60 BPM

## 2021-01-27 VITALS
HEIGHT: 65 IN | TEMPERATURE: 97.1 F | BODY MASS INDEX: 26.33 KG/M2 | WEIGHT: 158 LBS | HEART RATE: 75 BPM | DIASTOLIC BLOOD PRESSURE: 58 MMHG | SYSTOLIC BLOOD PRESSURE: 117 MMHG

## 2021-01-27 DIAGNOSIS — R42 DIZZINESS AND GIDDINESS: ICD-10-CM

## 2021-01-27 DIAGNOSIS — R55 DIZZINESS AND GIDDINESS: ICD-10-CM

## 2021-01-27 PROCEDURE — 99072 ADDL SUPL MATRL&STAF TM PHE: CPT

## 2021-01-27 PROCEDURE — 99215 OFFICE O/P EST HI 40 MIN: CPT

## 2021-02-02 NOTE — HISTORY OF PRESENT ILLNESS
[FreeTextEntry1] : Kavita Magallon is a 55 year old woman with cerebral palsy presenting for a follow up appointment for worsening balance, headaches, and dizziness. \par \par She endorses she feels dizzy and off balance with changing positions and this has worsened over the past three to four days. She feels an increase in lightheadedness when getting out of bed in the morning. She has not had a syncopal episode but feels as if she "may pass out."  Ms. Magallon has not had any recent falls. \par \par Her last seizure was in 2013 and she was unable to get the words out then 3.5 hours later she is back to baseline.\par \par She endorses her headaches are under control with Rizatriptan.\par \par The remaining neurological review of systems is negative. \par \par 10/27/20\par Kavita Magallon is a 55 year old woman with cerebral palsy presenting for a follow up for worsening balance and headaches. \par \par She has had headaches five times per week that last all day. She reports the Imitrex helps but it makes her feel fatigued and sleepy.   Ms. Magallon endorses that the pain starts in the occipital and neck area and radiating to the frontal and bitemporal. It is 10/10 on a pain scale and feels as if  a "knife is through her temple."\par \par She also reports her balance is worse. Physical therapy was ordered for her previously and she starts it on September 9th.  She feels dizzy and lightheaded at times. There have been no falls or injuries. She has 36 stairs in her apartment and plans on seeing rehab medicine on September 10th.  \par She does not go outside of the house much for a fear of falling.  \par \par Ms. Magallon has had nausea for 4-5 days constant with no change in medication. She has not followed up with her PCP yet for this. \par \par Ms. Magallon has not followed up with the behavioral health center yet for increased stress and anxiety.\par \par Currently, she is on baclofen 15mg three times per day. Her spasms have improved on the right side. She denies any side effects with the baclofen. \par \par She reports ongoing difficulty with her memory and has a planned neuropsychological evaluation. She did not get her B12 level done yet. \par \par The remaining neurological review of systems is negative. \par \par 7/27/20\par Kavita Magallon is a 55 year old woman with cerebral palsy. She presents today for worsening balance and stiffness in the right leg, as well as headaches.\par \par She currently lives on the third floor with no elevator.  She does not use a cane or assistive device to help her walk. Over the past four months, Ms. Magallon reports a change in balance, increase in difficulty walking and stiffness to her right leg.  She is currently taking Baclofen 10mg three times per day with no side effects. Baclofen 15mg three times per day was recommended on her last visit.  There is pain in her right leg from the hip throughout the whole leg. She reports the pain is difficult to describe but it feels as "if somebody is hitting her leg."  \par \par Ms. Magallon also reports a worsening of her preexisting headaches. She was on Topamax 100mg nightly that was tapered down by her primary doctor - currently, she reports taking 12.5mg of Topamax nightly.  She is not certain of the increased in the amount of frequency but is aware that the headaches increased overall.  The pain starts in the occipital area and travels to the bilateral frontal area of her head.  She has sound sensitivity and describes it as a "device" on her head.  She denies photophobia, nausea, vomiting.  Ms. Magallon has tried Ibuprofen and Naproxen in the past with no relief. She was given butalbital by another physician.  This has not provided her with any relief.  \par \par She reports intermittent urinary and fecal incontinence for at least one year. \par \par Ms. Magallon also reports worsening memory over 4-5 years.  She was unable to do neuropsychological testing due to insurance issues.  \par \par The remaining neurological review of systems is negative. \par \par 3/6/20: This is a followup visit for Ms. Magallon. She continues to have lightheadedness. She is worsening stiffness in her right side with multiple falls. She used to have a power chair. Now she is not using a cane or walker. No change in any of her other symptoms\par \par Initial consultation:\par Ms. Magallon is a 54 year old woman with a history of cerebral palsy with baseline mild cognitive impairment and spastic right hemiparesis. She has noticed gradually worsening cognitive and memory problems for the past 4-5 years. At the current time she forgets things that she was told 5 minutes before. She works as a  at “[employer]" and is able to perform her duties there although her manager has noticed the memory difficulty.  She has always had right-sided weakness and stiffness but there has been an increased amount of stiffness in the right side more recently. She takes baclofen 10 mg t.i.d. Her right foot is dragging and she sometimes trips on her toes on the right. She has had multiple falls. She has no history of epilepsy. She has a history of headache which was very well controlled on Topamax 75 mg at night. More recently the headaches have increased in frequency again.  Her mother has a history of cerebral aneurysm.

## 2021-02-02 NOTE — DATA REVIEWED
[de-identified] : MRI brain reviewed\par MRA head - no aneurysm [de-identified] : Feb 2020 - Routine EEG & Ambulatory EEG reviewed

## 2021-02-02 NOTE — ASSESSMENT
[FreeTextEntry1] : Ms. Magallon is a 55 year old woman with a history of cerebral palsy with several issues.\par \par History of cerebral palsy with spastic right hemiparesis.\par She has had increased spasticity in the right arm and leg which has improved with baclofen 20 mg TID - will continue this.\par \par Migraines controlled- Topamax and Butabitol stopped in the past. Unable to tolerate Imitrex.  \par Continue Rizatriptan 10 mg PRN and ondansetron. On next visit, we will discuss preventative medications. \par \par cervicogenic component of headache- pain management referral last visit.\par \par Nausea- follow up with PCP. PRN Zofran with migraine \par \par  She has had increased difficulty with her gait and has fallen. Physical medicine and rehabilitation consultation and physical therapy for gait and balance training and fall prevention. \par  I encouraged her to use an assistive device, at least a cane.\par MRI Cervical spine without contrast to rule out a cervical spondylotic myelopathy ordered at last visit.\par \par Dizziness with position change - orthostatic hypotension in office- tilt table evaluation. Cardiology referral. \par Follow up with PCP.\par \par Family history of cerebral aneurysm - no aneurysm on MRA head in October 2019.\par \par She has always had some mild cognitive impairment but her memory has worsened over the past 4-5 years.\par Neuropsychological evaluation.\par Vitamin B12 level.\par \par MRI - Prominent pituitary gland with increased size differential diagnosis includes pituitary adenoma. She saw an endocrinologist; referral to neurosurgery. \par Repeat MRI brain and pituitary \par \par Intermittent urinary and fecal incontinence for at least one year - urogynecology consult on last visit. \par \par Follow up in 6 weeks. \par

## 2021-02-03 ENCOUNTER — RESULT REVIEW (OUTPATIENT)
Age: 56
End: 2021-02-03

## 2021-03-01 ENCOUNTER — APPOINTMENT (OUTPATIENT)
Dept: NEUROLOGY | Facility: CLINIC | Age: 56
End: 2021-03-01

## 2021-03-15 ENCOUNTER — RX RENEWAL (OUTPATIENT)
Age: 56
End: 2021-03-15

## 2021-03-26 ENCOUNTER — APPOINTMENT (OUTPATIENT)
Dept: NEUROLOGY | Facility: CLINIC | Age: 56
End: 2021-03-26

## 2021-04-07 ENCOUNTER — APPOINTMENT (OUTPATIENT)
Dept: PULMONOLOGY | Facility: CLINIC | Age: 56
End: 2021-04-07
Payer: MEDICARE

## 2021-04-07 VITALS — HEIGHT: 65 IN | BODY MASS INDEX: 26.33 KG/M2 | WEIGHT: 158 LBS

## 2021-04-07 DIAGNOSIS — G47.19 OTHER HYPERSOMNIA: ICD-10-CM

## 2021-04-07 DIAGNOSIS — G47.33 OBSTRUCTIVE SLEEP APNEA (ADULT) (PEDIATRIC): ICD-10-CM

## 2021-04-07 PROCEDURE — 99213 OFFICE O/P EST LOW 20 MIN: CPT | Mod: 95

## 2021-04-07 NOTE — REASON FOR VISIT
[Follow-Up] : a follow-up visit [Home] : at home, [unfilled] , at the time of the visit. [Medical Office: ([facility])___] : at the medical office located in  [Verbal consent obtained from patient] : the patient, [unfilled] [FreeTextEntry1] : sleep apnea

## 2021-04-07 NOTE — ASSESSMENT
[FreeTextEntry1] : 55-year-old woman with multiple medical problems has mild obstructive sleep apnea with excessive daytime sleepiness.\par \par Given excessive daytime sleepiness and mild sleep apnea with desaturations patient will benefit with CPAP therapy.  Will order AutoPap 5 to 20 cm of pressure and adjust the pressures based on the download data.

## 2021-04-07 NOTE — HISTORY OF PRESENT ILLNESS
[FreeTextEntry1] : Dr. Danyell Fox\par Dr. Ledesma\par 55 year old woman  with history of cerebral palsy, forgetfullness, pituatary mass is here in the sleep center to address sleep apnea.  Patient is sleepy with [place] sleepiness score of 14.  Patient has very loud snoring which disturbs her fiance, also has witnessed apneas and choking sensations.  Patient's bedtime is around 9.30-10 PM wakes up in the morning around 7.30 AM. She feels tired when she wakes up. \par \par Due to above symptoms patient underwent a sleep study which showed mild obstructive sleep apnea with desaturations, AHI on the study is 12.5, T 90 is 38 minutes.  Discussed these results in detail and patient agreed to go on CPAP therapy given the excessive daytime sleepiness that patient has.\par

## 2021-04-19 ENCOUNTER — APPOINTMENT (OUTPATIENT)
Dept: NEUROLOGY | Facility: CLINIC | Age: 56
End: 2021-04-19
Payer: MEDICARE

## 2021-04-19 VITALS
SYSTOLIC BLOOD PRESSURE: 106 MMHG | TEMPERATURE: 97.2 F | DIASTOLIC BLOOD PRESSURE: 68 MMHG | HEART RATE: 97 BPM | WEIGHT: 158 LBS | HEIGHT: 65 IN | BODY MASS INDEX: 26.33 KG/M2

## 2021-04-19 PROCEDURE — 99214 OFFICE O/P EST MOD 30 MIN: CPT

## 2021-04-19 PROCEDURE — 99072 ADDL SUPL MATRL&STAF TM PHE: CPT

## 2021-04-19 RX ORDER — PAROXETINE HYDROCHLORIDE 20 MG/1
20 TABLET, FILM COATED ORAL
Qty: 30 | Refills: 0 | Status: DISCONTINUED | COMMUNITY
Start: 2021-01-16

## 2021-04-19 RX ORDER — PAROXETINE HYDROCHLORIDE 40 MG/1
TABLET, FILM COATED ORAL
Refills: 0 | Status: DISCONTINUED | COMMUNITY
End: 2021-04-19

## 2021-04-19 RX ORDER — PAROXETINE HYDROCHLORIDE 10 MG/1
10 TABLET, FILM COATED ORAL
Qty: 60 | Refills: 0 | Status: DISCONTINUED | COMMUNITY
Start: 2020-09-12

## 2021-04-19 RX ORDER — SULFAMETHOXAZOLE AND TRIMETHOPRIM 800; 160 MG/1; MG/1
800-160 TABLET ORAL
Qty: 10 | Refills: 0 | Status: DISCONTINUED | COMMUNITY
Start: 2021-02-03

## 2021-04-21 NOTE — PHYSICAL EXAM
[FreeTextEntry1] : Physical examination \par \par \par Mental status \par Awake, alert, appropriate.  Gives detailed history.\par \par MoCA\par October 2, 2019 - 21/30; memory 2/5\par \par Motor exam  \par Muscle tone - spasticity in the right arm and leg.  Very slow and clumsy rapid successive movements in the right hand.\par No atrophy or fasciculations \par Muscle Strength: arms and legs, proximal and distal flexors and extensors are normal except for right: Finger extension 4-; ankle and toe movement 0; others on the right 4—>4+\par \par \par Reflexes \par Right: 3, brachioradialis and biceps with spread to the finger flexors.\par Right patellar 4+\par Right achilles 4+\par Left 2\par \par Plantars right: Upgoing.   \par Plantars left: mute.   \par \par \par Slow, no ataxia - FNF, EDDIE, HKS on the right. \par \par Gait \par Right spastic, hemiparetic gait. Slow, tentative. \par

## 2021-04-21 NOTE — CONSULT LETTER
[Dear  ___] : Dear  [unfilled], [FreeTextEntry1] : I had the pleasure of evaluating your patient, ROSA ACHARYA, Please see the assessment section below for a summary of my diagnostic impression and plan.\par \par Thank you very much for allowing me to participate in the care of this patient. If you have any questions, please do not hesitate to contact me.\par \par Sincerely,\par \par Deneen Ledesma MD\par \par ALVAREZ Decker.P.\par \par

## 2021-04-21 NOTE — DATA REVIEWED
[de-identified] : MRI brain reviewed\par MRA head - no aneurysm [de-identified] : Feb 2020 - Routine EEG & Ambulatory EEG reviewed

## 2021-04-21 NOTE — ASSESSMENT
[FreeTextEntry1] : Ms. Magallon is a 55 year old woman with a history of cerebral palsy with several issues.\par \par History of cerebral palsy with spastic right hemiparesis.\par She has had increased spasticity in the right arm and leg which has improved with baclofen 20 mg TID - will continue this.\par \par Migraines - Topamax and Butabitol stopped in the past. Unable to tolerate Imitrex.  Rizatriptan helps but patient concerned with interactions with Trazodone and increased anxiety and depression.\par Trial of Frova. \par  On next visit, we will discuss preventative medications. \par \par cervicogenic component of headache- pain management referral last visit.\par \par Nausea- follow up with PCP. PRN Zofran with migraine \par \par  She has had increased difficulty with her gait and has fallen. Physical medicine and rehabilitation consultation and physical therapy for gait and balance training and fall prevention. \par  I encouraged her to use an assistive device, at least a cane.\par MRI Cervical spine without contrast to rule out a cervical spondylotic myelopathy ordered at last visit.\par \par Dizziness with position change - orthostatic hypotension in office- tilt table evaluation. Cardiology referral. \par Follow up with PCP.\par \par Family history of cerebral aneurysm - no aneurysm on MRA head in October 2019.\par \par She has always had some mild cognitive impairment but her memory has worsened over the past 4-5 years.\par Neuropsychological evaluation.\par Vitamin B12 level.\par \par MRI - Prominent pituitary gland with increased size differential diagnosis includes pituitary adenoma. She saw an endocrinologist; referral to neurosurgery. \par Repeat MRI brain and pituitary \par \par Intermittent urinary and fecal incontinence for at least one year - urogynecology consult on last visit. \par \par Follow up in 6 weeks. \par

## 2021-06-03 ENCOUNTER — APPOINTMENT (OUTPATIENT)
Dept: NEUROLOGY | Facility: CLINIC | Age: 56
End: 2021-06-03
Payer: MEDICARE

## 2021-06-03 VITALS
BODY MASS INDEX: 26.33 KG/M2 | SYSTOLIC BLOOD PRESSURE: 96 MMHG | HEIGHT: 65 IN | DIASTOLIC BLOOD PRESSURE: 54 MMHG | TEMPERATURE: 97.5 F | HEART RATE: 106 BPM | WEIGHT: 158 LBS

## 2021-06-03 DIAGNOSIS — R20.2 PARESTHESIA OF SKIN: ICD-10-CM

## 2021-06-03 PROCEDURE — 99072 ADDL SUPL MATRL&STAF TM PHE: CPT

## 2021-06-03 PROCEDURE — 99214 OFFICE O/P EST MOD 30 MIN: CPT

## 2021-06-03 RX ORDER — HYDROXYZINE HYDROCHLORIDE 10 MG/1
10 TABLET ORAL
Qty: 30 | Refills: 0 | Status: DISCONTINUED | COMMUNITY
Start: 2021-05-01

## 2021-06-03 RX ORDER — ATOMOXETINE 10 MG/1
10 CAPSULE ORAL
Qty: 60 | Refills: 0 | Status: DISCONTINUED | COMMUNITY
Start: 2021-02-13

## 2021-06-03 RX ORDER — ATOMOXETINE 40 MG/1
40 CAPSULE ORAL
Qty: 30 | Refills: 0 | Status: DISCONTINUED | COMMUNITY
Start: 2021-04-03

## 2021-06-07 PROBLEM — R20.2 PARESTHESIAS: Status: ACTIVE | Noted: 2020-02-05

## 2021-06-07 NOTE — DATA REVIEWED
[de-identified] : MRI brain reviewed\par MRA head - no aneurysm [de-identified] : Feb 2020 - Routine EEG & Ambulatory EEG reviewed

## 2021-06-07 NOTE — HISTORY OF PRESENT ILLNESS
[FreeTextEntry1] : Kavita Magallon is a 55 year old woman with cerebral palsy presenting for a follow up appointment for headaches and gait difficulties.\par \par She endorses her walker is heavy and she is falling while ambulating up and down the stairs. She did not complete the MRIs previously ordered. She is planning on moving in January to [place]. Her headaches are much improved. \par \par The remaining neurological review of systems is negative. \par \par 4/19/21\par Kavita Magallon is a 55 year old woman with cerebral palsy presenting for a follow up appointment for worsening headaches. \par \par She endorses she has had worsening anxiety and depression since her last visit. Her psychiatrist discontinued Paxil (stopped one month ago) and she is now taking Trazodone. She has migraines most of the day and Rizatriptan did help but she feels it has worsened her anxiety and depression. \par She tried Topamax, Butabitol,  and Imitrex in the past. \par \par Ms. Magallon had physical therapy and has a right leg brace. \par \par She had her COVID-19 vaccine 1st dose of Moderna two weeks ago and second dose due on 5/7. \par \par The remaining neurological review of systems is negative. \par \par 1/27/21\par Kavita Magallon is a 55 year old woman with cerebral palsy presenting for a follow up appointment for worsening balance, headaches, and dizziness. \par \par She endorses she feels dizzy and off balance with changing positions and this has worsened over the past three to four days. She feels an increase in lightheadedness when getting out of bed in the morning. She has not had a syncopal episode but feels as if she "may pass out."  Ms. Magallon has not had any recent falls. \par \par Her last seizure was in 2013 and she was unable to get the words out then 3.5 hours later she is back to baseline.\par \par She endorses her headaches are under control with Rizatriptan.\par \par The remaining neurological review of systems is negative. \par \par 10/27/20\par Kavita Magallon is a 55 year old woman with cerebral palsy presenting for a follow up for worsening balance and headaches. \par \par She has had headaches five times per week that last all day. She reports the Imitrex helps but it makes her feel fatigued and sleepy.   Ms. Magallon endorses that the pain starts in the occipital and neck area and radiating to the frontal and bitemporal. It is 10/10 on a pain scale and feels as if  a "knife is through her temple."\par \par She also reports her balance is worse. Physical therapy was ordered for her previously and she starts it on September 9th.  She feels dizzy and lightheaded at times. There have been no falls or injuries. She has 36 stairs in her apartment and plans on seeing rehab medicine on September 10th.  \par She does not go outside of the house much for a fear of falling.  \par \par Ms. Magallon has had nausea for 4-5 days constant with no change in medication. She has not followed up with her PCP yet for this. \par \par Ms. Magallon has not followed up with the behavioral health center yet for increased stress and anxiety.\par \par Currently, she is on baclofen 15mg three times per day. Her spasms have improved on the right side. She denies any side effects with the baclofen. \par \par She reports ongoing difficulty with her memory and has a planned neuropsychological evaluation. She did not get her B12 level done yet. \par \par The remaining neurological review of systems is negative. \par \par 7/27/20\par Kavita Magallon is a 55 year old woman with cerebral palsy. She presents today for worsening balance and stiffness in the right leg, as well as headaches.\par \par She currently lives on the third floor with no elevator.  She does not use a cane or assistive device to help her walk. Over the past four months, Ms. Magallon reports a change in balance, increase in difficulty walking and stiffness to her right leg.  She is currently taking Baclofen 10mg three times per day with no side effects. Baclofen 15mg three times per day was recommended on her last visit.  There is pain in her right leg from the hip throughout the whole leg. She reports the pain is difficult to describe but it feels as "if somebody is hitting her leg."  \par \par Ms. Magallon also reports a worsening of her preexisting headaches. She was on Topamax 100mg nightly that was tapered down by her primary doctor - currently, she reports taking 12.5mg of Topamax nightly.  She is not certain of the increased in the amount of frequency but is aware that the headaches increased overall.  The pain starts in the occipital area and travels to the bilateral frontal area of her head.  She has sound sensitivity and describes it as a "device" on her head.  She denies photophobia, nausea, vomiting.  Ms. Magallon has tried Ibuprofen and Naproxen in the past with no relief. She was given butalbital by another physician.  This has not provided her with any relief.  \par \par She reports intermittent urinary and fecal incontinence for at least one year. \par \par Ms. Magallon also reports worsening memory over 4-5 years.  She was unable to do neuropsychological testing due to insurance issues.  \par \par The remaining neurological review of systems is negative. \par \par 3/6/20: This is a followup visit for Ms. Magallon. She continues to have lightheadedness. She is worsening stiffness in her right side with multiple falls. She used to have a power chair. Now she is not using a cane or walker. No change in any of her other symptoms\par \par Initial consultation:\par Ms. Magallon is a 54 year old woman with a history of cerebral palsy with baseline mild cognitive impairment and spastic right hemiparesis. She has noticed gradually worsening cognitive and memory problems for the past 4-5 years. At the current time she forgets things that she was told 5 minutes before. She works as a  at “[employer]" and is able to perform her duties there although her manager has noticed the memory difficulty.  She has always had right-sided weakness and stiffness but there has been an increased amount of stiffness in the right side more recently. She takes baclofen 10 mg t.i.d. Her right foot is dragging and she sometimes trips on her toes on the right. She has had multiple falls. She has no history of epilepsy. She has a history of headache which was very well controlled on Topamax 75 mg at night. More recently the headaches have increased in frequency again.  Her mother has a history of cerebral aneurysm.

## 2021-06-07 NOTE — ASSESSMENT
[FreeTextEntry1] : Ms. Magallon is a 55 year old woman with a history of cerebral palsy with several issues.\par \par History of cerebral palsy with spastic right hemiparesis.\par She has had increased spasticity in the right arm and leg which has improved with baclofen 20 mg TID - will continue this.\par \par Migraines - Topamax and Butabitol stopped in the past. Unable to tolerate Imitrex.  Rizatriptan helps but patient concerned with interactions with Trazodone and increased anxiety and depression.\par Headaches improved with Frova. Continue Frova PRN. \par  On next visit, we will discuss preventative medications. \par \par Cervicogenic component of headache- pain management referral last visit.\par \par Nausea- follow up with PCP. PRN Zofran with migraine \par \par  She has had increased difficulty with her gait and has fallen. Physical medicine and rehabilitation consultation and physical therapy for gait and balance training and fall prevention. \par  I encouraged her to use an assistive device, at least a cane.\par MRI Cervical spine without contrast to rule out a cervical spondylotic myelopathy ordered at last visit.\par Follow up with Dr. Lopez\par \par Dizziness with position change - orthostatic hypotension in office- tilt table evaluation. Cardiology referral. \par Follow up with PCP.\par \par Family history of cerebral aneurysm - no aneurysm on MRA head in October 2019.\par \par She has always had some mild cognitive impairment but her memory has worsened over the past 4-5 years.\par Neuropsychological evaluation.\par Vitamin B12 level.\par \par MRI - Prominent pituitary gland with increased size differential diagnosis includes pituitary adenoma. She saw an endocrinologist; referral to neurosurgery. \par Repeat MRI brain and pituitary \par \par Intermittent urinary and fecal incontinence for at least one year - urogynecology consult on last visit. \par \par Follow up in 6 weeks. \par \par I discussed in detail with the patient  the diagnosis, prognosis, treatment plan and answered all of her questions.\par \par \par

## 2021-07-16 ENCOUNTER — APPOINTMENT (OUTPATIENT)
Dept: NEUROLOGY | Facility: CLINIC | Age: 56
End: 2021-07-16
Payer: MEDICARE

## 2021-07-16 VITALS
DIASTOLIC BLOOD PRESSURE: 47 MMHG | HEIGHT: 65 IN | WEIGHT: 158 LBS | BODY MASS INDEX: 26.33 KG/M2 | SYSTOLIC BLOOD PRESSURE: 99 MMHG | TEMPERATURE: 97.2 F | HEART RATE: 76 BPM

## 2021-07-16 VITALS — DIASTOLIC BLOOD PRESSURE: 68 MMHG | SYSTOLIC BLOOD PRESSURE: 101 MMHG | HEART RATE: 79 BPM

## 2021-07-16 PROCEDURE — 99072 ADDL SUPL MATRL&STAF TM PHE: CPT

## 2021-07-16 PROCEDURE — 99214 OFFICE O/P EST MOD 30 MIN: CPT

## 2021-07-16 RX ORDER — RIZATRIPTAN BENZOATE 10 MG/1
10 TABLET ORAL
Qty: 20 | Refills: 2 | Status: DISCONTINUED | COMMUNITY
Start: 2020-09-04 | End: 2021-07-16

## 2021-07-16 RX ORDER — ONDANSETRON 4 MG/1
4 TABLET ORAL
Qty: 10 | Refills: 0 | Status: ACTIVE | COMMUNITY
Start: 2021-07-16 | End: 1900-01-01

## 2021-07-26 NOTE — CONSULT LETTER
[Dear  ___] : Dear  [unfilled], [FreeTextEntry1] : I had the pleasure of evaluating your patient, ROSA ACHARYA, Please see the assessment section below for a summary of my diagnostic impression and plan.\par \par Thank you very much for allowing me to participate in the care of this patient. If you have any questions, please do not hesitate to contact me.\par \par Sincerely,\par \par Deneen Ledesma MD\par Maris Saldaña N.P.\par \par

## 2021-07-26 NOTE — ASSESSMENT
[FreeTextEntry1] : Ms. Magallon is a 55 year old woman with a history of cerebral palsy with several issues.\par \par History of cerebral palsy with spastic right hemiparesis.\par She has had increased spasticity in the right arm and leg which has improved with baclofen 20 mg TID - will continue this.\par \par Migraines - Topamax and Butabitol stopped in the past. Unable to tolerate Imitrex. Rizatriptan helps but patient concerned with interactions with Trazodone and increased anxiety and depression.\par Headaches improved with Frova. Continue Frova PRN. \par  On next visit, we will discuss preventative medications. \par Toradol PRN for five days to help break cycle of headaches. \par \par Cervicogenic component of headache- pain management referral last visit.\par \par Nausea- follow up with PCP. PRN Zofran with migraine \par \par  She has had increased difficulty with her gait and has fallen. Physical medicine and rehabilitation consultation and physical therapy for gait and balance training and fall prevention. \par  I encouraged her to use an assistive device, at least a cane.\par \par MRI Cervical spine without contrast to rule out a cervical spondylotic myelopathy ordered at last visit.\par Follow up with Dr. Lopez\par \par Dizziness with position change - orthostatic hypotension in office- tilt table evaluation. Cardiology referral. \par Follow up with PCP.\par \par Family history of cerebral aneurysm - no aneurysm on MRA head in October 2019.\par \par She has always had some mild cognitive impairment but her memory has worsened over the past 4-5 years.\par Neuropsychological evaluation.\par Vitamin B12 level.\par \par MRI - Prominent pituitary gland with increased size differential diagnosis includes pituitary adenoma. She saw an endocrinologist; referral to neurosurgery. \par Repeat MRI brain and pituitary \par \par Intermittent urinary and fecal incontinence for at least one year - urogynecology consult on last visit. \par \par Follow up in 3 months.\par \par I discussed in detail with the patient the diagnosis, prognosis, treatment plan and answered all of her questions.\par

## 2021-07-26 NOTE — DATA REVIEWED
[de-identified] : Feb 2020 - Routine EEG & Ambulatory EEG reviewed [de-identified] : MRI brain reviewed\par MRA head - no aneurysm

## 2021-07-26 NOTE — HISTORY OF PRESENT ILLNESS
[FreeTextEntry1] : Kavita Magallon is a 55 year old woman with cerebral palsy presenting for a follow up appointment for headaches and gait difficulties.\par \par She endorses an increased amount of stress due to possible eviction and relationship difficulties. The increased stress has caused her to have daily headaches for two weeks radiating up from her neck to her forehead and bilateral temples. Positive photophobia and nausea. Denies vomiting. She endorses taking the Frova with Advil and no relief. \par \par Her walker is heavy and she has difficulty ambulating up the stairs with it. She did not do the MRIs or follow up with the physiatrist yet but plans on doing it. \par \par She is currently taking Baclofen 20mg three times daily without side effects. \par \par The remaining neurological review of systems is negative. \par \par 6/3/21\par Kavita Magallon is a 55 year old woman with cerebral palsy presenting for a follow up appointment for headaches and gait difficulties.\par \par She endorses her walker is heavy and she is falling while ambulating up and down the stairs. She did not complete the MRIs previously ordered. She is planning on moving in January to [place]. Her headaches are much improved. \par \par The remaining neurological review of systems is negative. \par \par 4/19/21\par Kavita Magallon is a 55 year old woman with cerebral palsy presenting for a follow up appointment for worsening headaches. \par \par She endorses she has had worsening anxiety and depression since her last visit. Her psychiatrist discontinued Paxil (stopped one month ago) and she is now taking Trazodone. She has migraines most of the day and Rizatriptan did help but she feels it has worsened her anxiety and depression. \par She tried Topamax, Butabitol,  and Imitrex in the past. \par \par Ms. Magallon had physical therapy and has a right leg brace. \par \par She had her COVID-19 vaccine 1st dose of Moderna two weeks ago and second dose due on 5/7. \par \par The remaining neurological review of systems is negative. \par \par 1/27/21\par Kavita Magallon is a 55 year old woman with cerebral palsy presenting for a follow up appointment for worsening balance, headaches, and dizziness. \par \par She endorses she feels dizzy and off balance with changing positions and this has worsened over the past three to four days. She feels an increase in lightheadedness when getting out of bed in the morning. She has not had a syncopal episode but feels as if she "may pass out."  Ms. Magallon has not had any recent falls. \par \par Her last seizure was in 2013 and she was unable to get the words out then 3.5 hours later she is back to baseline.\par \par She endorses her headaches are under control with Rizatriptan.\par \par The remaining neurological review of systems is negative. \par \par 10/27/20\par Kavita Magallon is a 55 year old woman with cerebral palsy presenting for a follow up for worsening balance and headaches. \par \par She has had headaches five times per week that last all day. She reports the Imitrex helps but it makes her feel fatigued and sleepy.   Ms. Magallon endorses that the pain starts in the occipital and neck area and radiating to the frontal and bitemporal. It is 10/10 on a pain scale and feels as if  a "knife is through her temple."\par \par She also reports her balance is worse. Physical therapy was ordered for her previously and she starts it on September 9th.  She feels dizzy and lightheaded at times. There have been no falls or injuries. She has 36 stairs in her apartment and plans on seeing rehab medicine on September 10th.  \par She does not go outside of the house much for a fear of falling.  \par \par Ms. Magallon has had nausea for 4-5 days constant with no change in medication. She has not followed up with her PCP yet for this. \par \par Ms. Magallon has not followed up with the behavioral health center yet for increased stress and anxiety.\par \par Currently, she is on baclofen 15mg three times per day. Her spasms have improved on the right side. She denies any side effects with the baclofen. \par \par She reports ongoing difficulty with her memory and has a planned neuropsychological evaluation. She did not get her B12 level done yet. \par \par The remaining neurological review of systems is negative. \par \par 7/27/20\par Kavita Magallon is a 55 year old woman with cerebral palsy. She presents today for worsening balance and stiffness in the right leg, as well as headaches.\par \par She currently lives on the third floor with no elevator.  She does not use a cane or assistive device to help her walk. Over the past four months, Ms. Magallon reports a change in balance, increase in difficulty walking and stiffness to her right leg.  She is currently taking Baclofen 10mg three times per day with no side effects. Baclofen 15mg three times per day was recommended on her last visit.  There is pain in her right leg from the hip throughout the whole leg. She reports the pain is difficult to describe but it feels as "if somebody is hitting her leg."  \par \par Ms. Magallon also reports a worsening of her preexisting headaches. She was on Topamax 100mg nightly that was tapered down by her primary doctor - currently, she reports taking 12.5mg of Topamax nightly.  She is not certain of the increased in the amount of frequency but is aware that the headaches increased overall.  The pain starts in the occipital area and travels to the bilateral frontal area of her head.  She has sound sensitivity and describes it as a "device" on her head.  She denies photophobia, nausea, vomiting.  Ms. Magallon has tried Ibuprofen and Naproxen in the past with no relief. She was given butalbital by another physician.  This has not provided her with any relief.  \par \par She reports intermittent urinary and fecal incontinence for at least one year. \par \par Ms. Magallon also reports worsening memory over 4-5 years.  She was unable to do neuropsychological testing due to insurance issues.  \par \par The remaining neurological review of systems is negative. \par \par 3/6/20: This is a followup visit for Ms. Magallon. She continues to have lightheadedness. She is worsening stiffness in her right side with multiple falls. She used to have a power chair. Now she is not using a cane or walker. No change in any of her other symptoms\par \par Initial consultation:\par Ms. Magallon is a 54 year old woman with a history of cerebral palsy with baseline mild cognitive impairment and spastic right hemiparesis. She has noticed gradually worsening cognitive and memory problems for the past 4-5 years. At the current time she forgets things that she was told 5 minutes before. She works as a  at “[employer]" and is able to perform her duties there although her manager has noticed the memory difficulty.  She has always had right-sided weakness and stiffness but there has been an increased amount of stiffness in the right side more recently. She takes baclofen 10 mg t.i.d. Her right foot is dragging and she sometimes trips on her toes on the right. She has had multiple falls. She has no history of epilepsy. She has a history of headache which was very well controlled on Topamax 75 mg at night. More recently the headaches have increased in frequency again.  Her mother has a history of cerebral aneurysm.

## 2021-07-26 NOTE — END OF VISIT
[Time Spent: ___ minutes] : I have spent [unfilled] minutes of time on the encounter. [FreeTextEntry3] : I was present with the Nurse Practitioner during the key portions of the history and exam. I agree with the findings and plan as documented in the Nurse Practitioner’s note, unless noted below.\par Attesting Faculty: See Attending Electronic Signature Below\par \par

## 2021-08-07 NOTE — ASSESSMENT
[FreeTextEntry1] : Patient has symptoms suggestive of sleep apnea. Will order a sleep study. Discussed about details of the study and possible treatment options.\par Explained to patient about sedatives and pain medications worsening the degree of apnea.\par 
No

## 2021-08-26 ENCOUNTER — APPOINTMENT (OUTPATIENT)
Dept: PHYSICAL MEDICINE AND REHAB | Facility: CLINIC | Age: 56
End: 2021-08-26
Payer: MEDICARE

## 2021-08-26 ENCOUNTER — RESULT REVIEW (OUTPATIENT)
Age: 56
End: 2021-08-26

## 2021-08-26 VITALS
HEIGHT: 65 IN | SYSTOLIC BLOOD PRESSURE: 119 MMHG | BODY MASS INDEX: 26.33 KG/M2 | OXYGEN SATURATION: 97 % | TEMPERATURE: 97.5 F | RESPIRATION RATE: 18 BRPM | DIASTOLIC BLOOD PRESSURE: 72 MMHG | WEIGHT: 158 LBS | HEART RATE: 101 BPM

## 2021-08-26 DIAGNOSIS — R26.89 OTHER ABNORMALITIES OF GAIT AND MOBILITY: ICD-10-CM

## 2021-08-26 DIAGNOSIS — Z87.898 PERSONAL HISTORY OF OTHER SPECIFIED CONDITIONS: ICD-10-CM

## 2021-08-26 DIAGNOSIS — G80.2 SPASTIC HEMIPLEGIC CEREBRAL PALSY: ICD-10-CM

## 2021-08-26 PROCEDURE — 99214 OFFICE O/P EST MOD 30 MIN: CPT

## 2021-08-27 PROBLEM — R26.89 OTHER ABNORMALITIES OF GAIT AND MOBILITY: Status: ACTIVE | Noted: 2019-10-02

## 2021-08-27 PROBLEM — G80.2 SPASTIC HEMIPLEGIC CEREBRAL PALSY: Status: ACTIVE | Noted: 2019-10-02

## 2021-08-27 PROBLEM — Z87.898 HISTORY OF GAIT DISORDER: Status: RESOLVED | Noted: 2021-08-26 | Resolved: 2021-08-27

## 2021-08-27 NOTE — PHYSICAL EXAM
[FreeTextEntry1] : Generally she is a well-developed female in no acute distress.  She has redness of bilateral hands possibly from pressure from using a Rollator.  She has tenderness in the right superior gluteal area and the right lateral hip.  There is no significant pain on range of motion.  Motor: right elbow flexion/elbow extension are 3+/5, finger flexion is to 2-3/5, IO is 0-1/5.  Left elbow flexion/elbow extension are 4/5, finger flexion is 4+/5, IO is 4/5.  Right hip flexion is about 2+/5, knee extension is 4-/5, dorsi and plantar flexion are 0/5.  Left lower extremity is about 4+ to 5-/5.  Gait she is supervision for sit to stand and ambulates with a rollator with variable foot drag on the right (intermittent trouble advancing the leg) and genu recurvatum.  She did not lose her balance.

## 2021-08-27 NOTE — ADDENDUM
[FreeTextEntry1] : 8/27: X-rays of AP/Lateral of right hip and AP of pelvis show no fx. or dislocation.  Results called to patient.

## 2021-08-27 NOTE — REVIEW OF SYSTEMS
[Joint Pain] : joint pain [Muscle Pain] : muscle pain [Muscle Weakness] : muscle weakness [Difficulty Walking] : difficulty walking

## 2021-08-27 NOTE — ASSESSMENT
[FreeTextEntry1] : Assessment/plan: Patient is a 56-year-old female with history of CP who presents for follow-up with complaints of increasing right-sided weakness and pain in the right buttock after falling.  Her pain may be due to myofascial pain but because she has fallen several times, I would want to get an x-ray of the right hip: AP and lateral and a single view of the pelvis.  She was asking me for a prescription for the rolling walker but I would not want to do that at this time.  I would want her to get the x-rays, get the MRIs, and follow-up with her neurologist first.\par Education provided and questions were answered.  \par Time of office visit is 30 minutes.

## 2021-08-27 NOTE — HISTORY OF PRESENT ILLNESS
[FreeTextEntry1] : Patient is a 56-year-old female with history of cerebral palsy who presents for follow-up after last being seen by me on September 10, 2020.  She is here because she is having more trouble walking and has fallen 3 times in the past 2 weeks.  She is interested in getting another kind of walker because the Rollator is too heavy and she has difficulty bringing the Rollator up the stairs in her apartment.  She complains of increasing dizziness, lightheadedness, and weakness especially on the right side.  She is unable to wear her right AFO because of increasing sensation of pins-and-needles after wearing the brace for several hours; she will contact the orthotist to adjust the brace.  She complains of increased right hip pain up to 6-7/10 after her fall.  She had pain prior to her falls about 4-5/10 but has noticed an increase in hip pain and difficulty walking since her recent falls.  She is due to get 3 MRIs as part of work-up from her neurologist.  She does not remember when her physical therapy ended.  She stopped working in July.  She denies any numbness.  She saw her PCP in March or April of this year and should see the PCP next week.  She saw Dr. Ledesma last month.

## 2021-09-01 ENCOUNTER — APPOINTMENT (OUTPATIENT)
Dept: NEUROLOGY | Facility: CLINIC | Age: 56
End: 2021-09-01
Payer: MEDICARE

## 2021-09-01 VITALS
BODY MASS INDEX: 24.99 KG/M2 | DIASTOLIC BLOOD PRESSURE: 69 MMHG | TEMPERATURE: 97.2 F | HEIGHT: 65 IN | HEART RATE: 85 BPM | SYSTOLIC BLOOD PRESSURE: 105 MMHG | WEIGHT: 150 LBS

## 2021-09-01 PROCEDURE — 99214 OFFICE O/P EST MOD 30 MIN: CPT

## 2021-09-01 RX ORDER — FLUOXETINE HYDROCHLORIDE 40 MG/1
CAPSULE ORAL
Refills: 0 | Status: DISCONTINUED | COMMUNITY
End: 2021-09-01

## 2021-09-01 NOTE — REVIEW OF SYSTEMS
[Dizziness] : dizziness [Lightheadedness] : lightheadedness [Frequent Falls] : frequent falls [Negative] : Heme/Lymph

## 2021-09-02 NOTE — HISTORY OF PRESENT ILLNESS
[FreeTextEntry1] : Kavita Magallon is a 56 year old woman with cerebral palsy presenting for a follow up appointment for frequent falls.\par \par Ms. Magallon endorses she had two episodes of falling due to her "legs collapsing." On 8/16 she had a fall backwards, no head injury where her knees were buckling. On 8/23 she fell again while getting food out of the fridge. She was incontinent of urine at that time. Her boyfriend witnessed jerking of the extremities. She denies loss of consciousness. Was alert and aware of event and felt fatigued afterwards. Ms. Magallon had a seizure in 2013 that manifested as word finding difficulties and falling onto the sofa.  FHx - mother - epilepsy. Denies any head injuries or MVA. Denies visual changes, weakness or numbness on one side of the body, change in speech or facial droop. \par \par Prior to these episodes she reports sleeping for three days continuously and increased fatigue. She reports a feeling of dizziness described as lightheadedness that is intermittent and does not occur with position change as previously reported. She is seeing cardiology tomorrow. She continues to have headaches and is taking Frova as needed for relief. She cannot recall if she tried the Ketorolac. Ms. Magallon reports an increased pressure sensation in her head when bending downward. \par \par Ms. Magallon endorses a worsening of her short term memory with delayed recall. She can manage her own medications and bills at home. She can also manage her own appointments. \par \par No change in medications.\par \edison Has straining with urination for 2-3 weeks and hesitancy with constipation (following up with urogynecologist scheduled by patient). \par \par She reports 5-6 falls in total and is seeing Dr. Lopez as well. She does not have physical therapy at home. \par \par The remaining neurological review of systems is negative. \par \par 7/16/21\par Kavita Magallon is a 55 year old woman with cerebral palsy presenting for a follow up appointment for headaches and gait difficulties.\par \par She endorses an increased amount of stress due to possible eviction and relationship difficulties. The increased stress has caused her to have daily headaches for two weeks radiating up from her neck to her forehead and bilateral temples. Positive photophobia and nausea. Denies vomiting. She endorses taking the Frova with Advil and no relief. \par \par Her walker is heavy and she has difficulty ambulating up the stairs with it. She did not do the MRIs or follow up with the physiatrist yet but plans on doing it. \par \par She is currently taking Baclofen 20mg three times daily without side effects. \par \par The remaining neurological review of systems is negative. \par \par 6/3/21\par Kavita Magallon is a 55 year old woman with cerebral palsy presenting for a follow up appointment for headaches and gait difficulties.\par \par She endorses her walker is heavy and she is falling while ambulating up and down the stairs. She did not complete the MRIs previously ordered. She is planning on moving in January to [place]. Her headaches are much improved. \par \par The remaining neurological review of systems is negative. \par \par 4/19/21\par Kavita Magallon is a 55 year old woman with cerebral palsy presenting for a follow up appointment for worsening headaches. \par \par She endorses she has had worsening anxiety and depression since her last visit. Her psychiatrist discontinued Paxil (stopped one month ago) and she is now taking Trazodone. She has migraines most of the day and Rizatriptan did help but she feels it has worsened her anxiety and depression. \par She tried Topamax, Butabitol,  and Imitrex in the past. \par \par Ms. Magallon had physical therapy and has a right leg brace. \par \par She had her COVID-19 vaccine 1st dose of Moderna two weeks ago and second dose due on 5/7. \par \par The remaining neurological review of systems is negative. \par \par 1/27/21\par Kavita Magallon is a 55 year old woman with cerebral palsy presenting for a follow up appointment for worsening balance, headaches, and dizziness. \par \par She endorses she feels dizzy and off balance with changing positions and this has worsened over the past three to four days. She feels an increase in lightheadedness when getting out of bed in the morning. She has not had a syncopal episode but feels as if she "may pass out."  Ms. Magallon has not had any recent falls. \par \par Her last seizure was in 2013 and she was unable to get the words out then 3.5 hours later she is back to baseline.\par \par She endorses her headaches are under control with Rizatriptan.\par \par The remaining neurological review of systems is negative. \par \par 10/27/20\par Kavita Magallon is a 55 year old woman with cerebral palsy presenting for a follow up for worsening balance and headaches. \par \par She has had headaches five times per week that last all day. She reports the Imitrex helps but it makes her feel fatigued and sleepy.   Ms. Magallon endorses that the pain starts in the occipital and neck area and radiating to the frontal and bitemporal. It is 10/10 on a pain scale and feels as if  a "knife is through her temple."\par \par She also reports her balance is worse. Physical therapy was ordered for her previously and she starts it on September 9th.  She feels dizzy and lightheaded at times. There have been no falls or injuries. She has 36 stairs in her apartment and plans on seeing rehab medicine on September 10th.  \par She does not go outside of the house much for a fear of falling.  \par \par Ms. Magallon has had nausea for 4-5 days constant with no change in medication. She has not followed up with her PCP yet for this. \par \par Ms. Magallon has not followed up with the behavioral health center yet for increased stress and anxiety.\par \par Currently, she is on baclofen 15mg three times per day. Her spasms have improved on the right side. She denies any side effects with the baclofen. \par \par She reports ongoing difficulty with her memory and has a planned neuropsychological evaluation. She did not get her B12 level done yet. \par \par The remaining neurological review of systems is negative. \par \par 7/27/20\par Kavita Magallon is a 55 year old woman with cerebral palsy. She presents today for worsening balance and stiffness in the right leg, as well as headaches.\par \par She currently lives on the third floor with no elevator.  She does not use a cane or assistive device to help her walk. Over the past four months, Ms. Magallon reports a change in balance, increase in difficulty walking and stiffness to her right leg.  She is currently taking Baclofen 10mg three times per day with no side effects. Baclofen 15mg three times per day was recommended on her last visit.  There is pain in her right leg from the hip throughout the whole leg. She reports the pain is difficult to describe but it feels as "if somebody is hitting her leg."  \par \par Ms. Magallon also reports a worsening of her preexisting headaches. She was on Topamax 100mg nightly that was tapered down by her primary doctor - currently, she reports taking 12.5mg of Topamax nightly.  She is not certain of the increased in the amount of frequency but is aware that the headaches increased overall.  The pain starts in the occipital area and travels to the bilateral frontal area of her head.  She has sound sensitivity and describes it as a "device" on her head.  She denies photophobia, nausea, vomiting.  Ms. Magallon has tried Ibuprofen and Naproxen in the past with no relief. She was given butalbital by another physician.  This has not provided her with any relief.  \par \par She reports intermittent urinary and fecal incontinence for at least one year. \par \par Ms. Magallon also reports worsening memory over 4-5 years.  She was unable to do neuropsychological testing due to insurance issues.  \par \par The remaining neurological review of systems is negative. \par \par 3/6/20: This is a followup visit for Ms. Magallon. She continues to have lightheadedness. She is worsening stiffness in her right side with multiple falls. She used to have a power chair. Now she is not using a cane or walker. No change in any of her other symptoms\par \par Initial consultation:\par Ms. Magallon is a 54 year old woman with a history of cerebral palsy with baseline mild cognitive impairment and spastic right hemiparesis. She has noticed gradually worsening cognitive and memory problems for the past 4-5 years. At the current time she forgets things that she was told 5 minutes before. She works as a  at “[employer]" and is able to perform her duties there although her manager has noticed the memory difficulty.  She has always had right-sided weakness and stiffness but there has been an increased amount of stiffness in the right side more recently. She takes baclofen 10 mg t.i.d. Her right foot is dragging and she sometimes trips on her toes on the right. She has had multiple falls. She has no history of epilepsy. She has a history of headache which was very well controlled on Topamax 75 mg at night. More recently the headaches have increased in frequency again.  Her mother has a history of cerebral aneurysm.

## 2021-09-02 NOTE — ASSESSMENT
[FreeTextEntry1] : Ms. Magallon is a 56 year old woman with a history of cerebral palsy with several issues.\par \par History of cerebral palsy with spastic right hemiparesis.\par She has had increased spasticity in the right arm and leg which has improved with baclofen 20 mg TID - will continue this.\par \par Recurrent episodes of falling- Routine and ambulatory EEG. \par MRI Brain (seizure protocol) to rule out any structural lesions. \par \par Migraines - Topamax and Butabitol stopped in the past. Unable to tolerate Imitrex. Rizatriptan helps but patient concerned with interactions with Trazodone and increased anxiety and depression.\par Headaches improved with Frova. Continue Frova PRN (may need to choose alternative patient states it is not covered by her insurance). \par  On next visit, we will discuss preventative medications. \par   \par Cervicogenic component of headache- pain management referral last visit.\par \par Nausea- follow up with PCP. PRN Zofran with migraine \par \par  She has had increased difficulty with her gait and has fallen. Physical medicine and rehabilitation consultation appreciated and physical therapy for gait and balance training and fall prevention. \par  I encouraged her to use an assistive device, at least a cane.\par \par MRI Cervical spine without contrast to rule out a cervical spondylotic myelopathy ordered at last visit (scheduled 9/7)\par Follow up with Dr. Lopez\par \par Dizziness with position change - orthostatic hypotension in office previously- tilt table evaluation. Cardiology referral previously. \par Follow up with PCP.\par \par Family history of cerebral aneurysm - no aneurysm on MRA head in October 2019.\par \par She has always had some mild cognitive impairment but her memory has worsened over the past 4-5 years.\par Neuropsychological evaluation.\par Vitamin B12 level.\par \par MRI - Prominent pituitary gland with increased size differential diagnosis includes pituitary adenoma. She saw an endocrinologist; referral to neurosurgery. \par Repeat MRI brain and pituitary \par \par Intermittent urinary and fecal incontinence for at least one year - urogynecology consult on last visit. Has appointment scheduled. \par \par Follow up in 3 weeks. \par \par I discussed in detail with the patient the diagnosis, prognosis, treatment plan and answered all of her questions.\par

## 2021-09-02 NOTE — DATA REVIEWED
[de-identified] : MRI brain reviewed\par MRA head - no aneurysm [de-identified] : Feb 2020 - Routine EEG & Ambulatory EEG reviewed

## 2021-09-07 ENCOUNTER — RESULT REVIEW (OUTPATIENT)
Age: 56
End: 2021-09-07

## 2021-09-08 ENCOUNTER — NON-APPOINTMENT (OUTPATIENT)
Age: 56
End: 2021-09-08

## 2021-09-17 ENCOUNTER — APPOINTMENT (OUTPATIENT)
Dept: NEUROLOGY | Facility: CLINIC | Age: 56
End: 2021-09-17

## 2021-09-20 ENCOUNTER — APPOINTMENT (OUTPATIENT)
Dept: NEUROLOGY | Facility: CLINIC | Age: 56
End: 2021-09-20

## 2021-09-22 ENCOUNTER — APPOINTMENT (OUTPATIENT)
Dept: NEUROLOGY | Facility: CLINIC | Age: 56
End: 2021-09-22
Payer: MEDICARE

## 2021-09-22 VITALS
BODY MASS INDEX: 24.99 KG/M2 | WEIGHT: 150 LBS | TEMPERATURE: 97.3 F | HEART RATE: 89 BPM | SYSTOLIC BLOOD PRESSURE: 99 MMHG | HEIGHT: 65 IN | DIASTOLIC BLOOD PRESSURE: 67 MMHG

## 2021-09-22 DIAGNOSIS — R26.89 OTHER ABNORMALITIES OF GAIT AND MOBILITY: ICD-10-CM

## 2021-09-22 PROCEDURE — 99214 OFFICE O/P EST MOD 30 MIN: CPT

## 2021-09-23 NOTE — PHYSICAL EXAM
[FreeTextEntry1] : Physical examination \par \par Mental status \par Awake, alert, appropriate.  Gives detailed history.\par Delayed recall 0/3. \par \par MoCA\par October 2, 2019 - 21/30; memory 2/5\par \par Cranial Nerves \par II: VFF  \par III, IV, VI: PERRL, EOMI.   \par V: Facial sensation is normal B/L.   \par VII: Facial strength is normal B/L. \par \par \par VIII: Gross hearing is intact.   \par \par IX, X: Palate is midline and elevates symmetrically.   \par XI: Trapezius normal strength.   \par XII: Tongue midline without atrophy or fasciculations. \par \par Motor exam  \par Muscle tone - spasticity in the right arm and leg.  Very slow and clumsy rapid successive movements in the right hand.\par No atrophy or fasciculations \par Muscle Strength: arms and legs, proximal and distal flexors and extensors are normal except for right: Finger extension 4-; ankle and toe movement 0; others on the right 4—>4+\par \par \par Reflexes \par Right: 3, brachioradialis and biceps with spread to the finger flexors.\par Right patellar 4+\par Right achilles 4+\par Left 2\par \par Plantars right: Upgoing.   \par Plantars left: mute.   \par \par Slow, no ataxia - FNF, EDDIE, HKS on the right. \par \par \par Gait \par Right spastic, hemiparetic gait with significant difficulty lifting right leg up and dragging her toes on the ground. . Slow, tentative. \par

## 2021-09-23 NOTE — DATA REVIEWED
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 No significant mass effect on the optic chiasm and optic nerves. No evidence of cavernous\par sinus involvement. No significant inferior extension into the sphenoid sinus.\par \par  No enhancing parenchymal lesion identified. No acute infarction, acute intracranial hemorrhage or\par hydrocephalus.\par \par  Redemonstration of marked left cerebral hemisphere volume loss, likely related to prior ischemia.\par \par \par MRI brain reviewed\par MRA head - no aneurysm [de-identified] : Feb 2020 - Routine EEG & Ambulatory EEG reviewed [de-identified] : 9/7/21\par MRI Cervical Spine\par Multilevel cervical spondylitic changes as detailed above most notable for mild canal stenosis at\par C5-C6 and moderate bilateral foraminal stenosis C5-6 and C6-7\par  Mild increased T2 cord signal at the C5-C6 level within the right posterior lateral cord, which is\par nonspecific and may reflect a small focus of myelomalacia.\par

## 2021-09-23 NOTE — HISTORY OF PRESENT ILLNESS
[FreeTextEntry1] : Kavita Magallon is a 56 year old woman with cerebral palsy presenting for a follow up appointment for frequent falls.\par \par Ms. Magallon endorses she has had multiple falls without injury due to her "legs collapsing." She feels her cerebral palsy is worse and is currently taking Baclofen 20mg three times daily. She endorses her right leg drags and "feels like a block of wood." No tongue biting noted during any of these falls. Ms. Magallon did not have the EEG done that was previously ordered. She is also seeing the cardiologist. \par \par She endorses an increased amount of stress at home due to her living situation. Plans on having an evaluation for a home health aide on October 26th. Currently, she lives in a building with stairs no elevator. Ms. Magallon plans on following up with her  for alternate arrangements. She has difficulty sleeping and is seeing psychiatry and was given Trazodone.  She also reports her short term recall is worse.\par \par The remaining neurological review of systems is negative.  \par \par 9/1/21\par Kavita Magallon is a 56 year old woman with cerebral palsy presenting for a follow up appointment for frequent falls.\par \par Ms. Magallon endorses she had two episodes of falling due to her "legs collapsing." On 8/16 she had a fall backwards, no head injury where her knees were buckling. On 8/23 she fell again while getting food out of the fridge. She was incontinent of urine at that time. Her boyfriend witnessed jerking of the extremities. She denies loss of consciousness. Was alert and aware of event and felt fatigued afterwards. Ms. Magallon had a seizure in 2013 that manifested as word finding difficulties and falling onto the sofa.  FHx - mother - epilepsy. Denies any head injuries or MVA. Denies visual changes, weakness or numbness on one side of the body, change in speech or facial droop. \par \par Prior to these episodes she reports sleeping for three days continuously and increased fatigue. She reports a feeling of dizziness described as lightheadedness that is intermittent and does not occur with position change as previously reported. She is seeing cardiology tomorrow. She continues to have headaches and is taking Frova as needed for relief. She cannot recall if she tried the Ketorolac. Ms. Magallon reports an increased pressure sensation in her head when bending downward. \par \par Ms. Magallon endorses a worsening of her short term memory with delayed recall. She can manage her own medications and bills at home. She can also manage her own appointments. \par \par No change in medications.\par \edison Has straining with urination for 2-3 weeks and hesitancy with constipation (following up with urogynecologist scheduled by patient). \par \par She reports 5-6 falls in total and is seeing Dr. Lopez as well. She does not have physical therapy at home. \par \par The remaining neurological review of systems is negative. \par \par 7/16/21\par Kavita Magallon is a 55 year old woman with cerebral palsy presenting for a follow up appointment for headaches and gait difficulties.\par \par She endorses an increased amount of stress due to possible eviction and relationship difficulties. The increased stress has caused her to have daily headaches for two weeks radiating up from her neck to her forehead and bilateral temples. Positive photophobia and nausea. Denies vomiting. She endorses taking the Frova with Advil and no relief. \par \par Her walker is heavy and she has difficulty ambulating up the stairs with it. She did not do the MRIs or follow up with the physiatrist yet but plans on doing it. \par \par She is currently taking Baclofen 20mg three times daily without side effects. \par \par The remaining neurological review of systems is negative. \par \par 6/3/21\par Kavita Magallon is a 55 year old woman with cerebral palsy presenting for a follow up appointment for headaches and gait difficulties.\par \par She endorses her walker is heavy and she is falling while ambulating up and down the stairs. She did not complete the MRIs previously ordered. She is planning on moving in January to [place]. Her headaches are much improved. \par \par The remaining neurological review of systems is negative. \par \par 4/19/21\par Kavita Magallon is a 55 year old woman with cerebral palsy presenting for a follow up appointment for worsening headaches. \par \par She endorses she has had worsening anxiety and depression since her last visit. Her psychiatrist discontinued Paxil (stopped one month ago) and she is now taking Trazodone. She has migraines most of the day and Rizatriptan did help but she feels it has worsened her anxiety and depression. \par She tried Topamax, Butabitol,  and Imitrex in the past. \par \par Ms. Magallon had physical therapy and has a right leg brace. \par \par She had her COVID-19 vaccine 1st dose of Moderna two weeks ago and second dose due on 5/7. \par \par The remaining neurological review of systems is negative. \par \par 1/27/21\par Kavita Magallon is a 55 year old woman with cerebral palsy presenting for a follow up appointment for worsening balance, headaches, and dizziness. \par \par She endorses she feels dizzy and off balance with changing positions and this has worsened over the past three to four days. She feels an increase in lightheadedness when getting out of bed in the morning. She has not had a syncopal episode but feels as if she "may pass out."  Ms. Magallon has not had any recent falls. \par \par Her last seizure was in 2013 and she was unable to get the words out then 3.5 hours later she is back to baseline.\par \par She endorses her headaches are under control with Rizatriptan.\par \par The remaining neurological review of systems is negative. \par \par 10/27/20\par Kavita Magallon is a 55 year old woman with cerebral palsy presenting for a follow up for worsening balance and headaches. \par \par She has had headaches five times per week that last all day. She reports the Imitrex helps but it makes her feel fatigued and sleepy.   Ms. Magallon endorses that the pain starts in the occipital and neck area and radiating to the frontal and bitemporal. It is 10/10 on a pain scale and feels as if  a "knife is through her temple."\par \par She also reports her balance is worse. Physical therapy was ordered for her previously and she starts it on September 9th.  She feels dizzy and lightheaded at times. There have been no falls or injuries. She has 36 stairs in her apartment and plans on seeing rehab medicine on September 10th.  \par She does not go outside of the house much for a fear of falling.  \par \par Ms. Magallon has had nausea for 4-5 days constant with no change in medication. She has not followed up with her PCP yet for this. \par \par Ms. Magallon has not followed up with the behavioral health center yet for increased stress and anxiety.\par \par Currently, she is on baclofen 15mg three times per day. Her spasms have improved on the right side. She denies any side effects with the baclofen. \par \par She reports ongoing difficulty with her memory and has a planned neuropsychological evaluation. She did not get her B12 level done yet. \par \par The remaining neurological review of systems is negative. \par \par 7/27/20\par Kavita Magallon is a 55 year old woman with cerebral palsy. She presents today for worsening balance and stiffness in the right leg, as well as headaches.\par \par She currently lives on the third floor with no elevator.  She does not use a cane or assistive device to help her walk. Over the past four months, Ms. Magallon reports a change in balance, increase in difficulty walking and stiffness to her right leg.  She is currently taking Baclofen 10mg three times per day with no side effects. Baclofen 15mg three times per day was recommended on her last visit.  There is pain in her right leg from the hip throughout the whole leg. She reports the pain is difficult to describe but it feels as "if somebody is hitting her leg."  \par \par Ms. Mgaallon also reports a worsening of her preexisting headaches. She was on Topamax 100mg nightly that was tapered down by her primary doctor - currently, she reports taking 12.5mg of Topamax nightly.  She is not certain of the increased in the amount of frequency but is aware that the headaches increased overall.  The pain starts in the occipital area and travels to the bilateral frontal area of her head.  She has sound sensitivity and describes it as a "device" on her head.  She denies photophobia, nausea, vomiting.  Ms. Magallon has tried Ibuprofen and Naproxen in the past with no relief. She was given butalbital by another physician.  This has not provided her with any relief.  \par \par She reports intermittent urinary and fecal incontinence for at least one year. \par \par Ms. Magallon also reports worsening memory over 4-5 years.  She was unable to do neuropsychological testing due to insurance issues.  \par \par The remaining neurological review of systems is negative. \par \par 3/6/20: This is a followup visit for Ms. Magallon. She continues to have lightheadedness. She is worsening stiffness in her right side with multiple falls. She used to have a power chair. Now she is not using a cane or walker. No change in any of her other symptoms\par \par Initial consultation:\par Ms. Magallon is a 54 year old woman with a history of cerebral palsy with baseline mild cognitive impairment and spastic right hemiparesis. She has noticed gradually worsening cognitive and memory problems for the past 4-5 years. At the current time she forgets things that she was told 5 minutes before. She works as a  at “[employer]" and is able to perform her duties there although her manager has noticed the memory difficulty.  She has always had right-sided weakness and stiffness but there has been an increased amount of stiffness in the right side more recently. She takes baclofen 10 mg t.i.d. Her right foot is dragging and she sometimes trips on her toes on the right. She has had multiple falls. She has no history of epilepsy. She has a history of headache which was very well controlled on Topamax 75 mg at night. More recently the headaches have increased in frequency again.  Her mother has a history of cerebral aneurysm.

## 2021-09-23 NOTE — ASSESSMENT
[FreeTextEntry1] : Ms. Magallon is a 56 year old woman with a history of cerebral palsy with several issues.\par \par History of cerebral palsy with spastic right hemiparesis.\par She has had increased spasticity in the right arm and leg which has improved with baclofen 20 mg TID - will continue this.  She has had increased spasticity more recently worsening her gait.  She does not want to consider any of the following options which were presented to her: add Tizandine, botox, intrathecal baclofen trial.\par \par  She has had increased difficulty with her gait and has fallen. Physical medicine and rehabilitation consultation appreciated and physical therapy for gait and balance training and fall prevention. \par  I encouraged her to use an assistive device, at least a cane.  MRI Cervical spine previously reviewed with patient - cervical spondylotic myelopathy not contributing to increased spasticity. \par \par Recurrent episodes of falling- Routine and ambulatory EEG. pt will schedule. \par MRI Brain (seizure protocol) reviewed with patient.\par PT referral.\par Continue follow up with rehab medicine for possible motorized scooter and other assistive devices. \par \par Migraines - Topamax and Butabitol stopped in the past. Unable to tolerate Imitrex. Rizatriptan helps but patient concerned with interactions with Trazodone and increased anxiety and depression.\par Headaches improved with Frova. Continue Frova PRN (may need to choose alternative patient states it is not covered by her insurance). \par  On next visit, we will discuss preventative medications. \par   \par Cervicogenic component of headache- pain management referral last visit.\par \par Nausea- follow up with PCP. PRN Zofran with migraine \par \par \par Dizziness with position change - orthostatic hypotension in office previously- tilt table evaluation. Cardiology referral previously. \par Follow up with PCP.\par \par Family history of cerebral aneurysm - no aneurysm on MRA head in October 2019.\par \par She has always had some mild cognitive impairment but her memory has worsened over the past 4-5 years.\par Neuropsychological evaluation.\par Vitamin B12 level.\par \par MRI - Prominent pituitary gland with increased size differential diagnosis includes pituitary adenoma in October. She saw an endocrinologist; referral to neurosurgery. \par Repeat MRI brain and pituitary with no change in size since October 2020.\par F/U with neurosurgery. \par \par Intermittent urinary and fecal incontinence for at least one year - urogynecology consult on last visit. Has appointment scheduled. \par \par Follow up in 6 weeks. \par \par I discussed in detail with the patient the diagnosis, prognosis, treatment plan and answered all of her questions.\par

## 2021-09-28 ENCOUNTER — APPOINTMENT (OUTPATIENT)
Dept: CARDIOLOGY | Facility: CLINIC | Age: 56
End: 2021-09-28
Payer: MEDICARE

## 2021-09-28 ENCOUNTER — NON-APPOINTMENT (OUTPATIENT)
Age: 56
End: 2021-09-28

## 2021-09-28 VITALS
DIASTOLIC BLOOD PRESSURE: 64 MMHG | RESPIRATION RATE: 13 BRPM | HEART RATE: 91 BPM | TEMPERATURE: 98.1 F | HEIGHT: 65 IN | BODY MASS INDEX: 25.33 KG/M2 | OXYGEN SATURATION: 97 % | WEIGHT: 152 LBS | SYSTOLIC BLOOD PRESSURE: 90 MMHG

## 2021-09-28 DIAGNOSIS — I95.1 ORTHOSTATIC HYPOTENSION: ICD-10-CM

## 2021-09-28 DIAGNOSIS — R55 SYNCOPE AND COLLAPSE: ICD-10-CM

## 2021-09-28 PROCEDURE — 99204 OFFICE O/P NEW MOD 45 MIN: CPT

## 2021-09-28 PROCEDURE — 93000 ELECTROCARDIOGRAM COMPLETE: CPT

## 2021-09-28 NOTE — HISTORY OF PRESENT ILLNESS
[FreeTextEntry1] : 55 yo female with cerebral palsy and possible pituitary adenoma (per 9/8/21 brain MRI), who presents today for cardiac evaluation of recurrent positional dizziness/falling/near syncope. She reports that she has several episodes of getting dizzy when using stairs and has fallen multiple times. She also reports similar episodes when getting up from a seated or supine position. She reports that the BP has been running low over the past year. She denies any prior cardiac testing. \par \par PMD: Danyell Diaz ([facility])\par Neuro: Deneen Ledesma\par Neurosurgery: Enzo Mcdonnell\par Endo: Ladonna Quintanilla ([facility])

## 2021-09-28 NOTE — PHYSICAL EXAM
[Well Developed] : well developed [Well Nourished] : well nourished [No Acute Distress] : no acute distress [Normal Conjunctiva] : normal conjunctiva [Normal Venous Pressure] : normal venous pressure [No Carotid Bruit] : no carotid bruit [Normal S1, S2] : normal S1, S2 [No Murmur] : no murmur [No Rub] : no rub [No Gallop] : no gallop [Clear Lung Fields] : clear lung fields [Good Air Entry] : good air entry [No Respiratory Distress] : no respiratory distress  [No Edema] : no edema [No Cyanosis] : no cyanosis [No Clubbing] : no clubbing [Alert and Oriented] : alert and oriented

## 2021-09-28 NOTE — ASSESSMENT
[FreeTextEntry1] : 55 yo female with cerebral palsy, possible pituitary adenoma (per 9/8/21 brain MRI), and recurrent positional dizziness/falling/near syncope. \par ECG today demonstrated sinus rhythm with possible left atrial enlargement.\par \par Given low BP today (SBP 90) and reported positional dizziness/near syncope, will start trial of midodrine 5 mg po tid and monitor clinical response. \par Patient was advised to monitor her BP while on midodrine and call if her BP remains low or becomes elevated. Will adjust as needed.\par Would recommend follow-up with neurosurgery regarding pituitary adenoma and endocrinologist, as patient's low BP/orthostatic hypotension may be due to hypopituitarism.\par Will perform echocardiogram to assess LV function and structural disease.\par Patient was advised to ensure adequate fluid intake.

## 2021-10-14 ENCOUNTER — APPOINTMENT (OUTPATIENT)
Dept: CARDIOLOGY | Facility: CLINIC | Age: 56
End: 2021-10-14

## 2021-11-03 ENCOUNTER — APPOINTMENT (OUTPATIENT)
Dept: NEUROLOGY | Facility: CLINIC | Age: 56
End: 2021-11-03
Payer: MEDICARE

## 2021-11-03 VITALS
HEART RATE: 98 BPM | SYSTOLIC BLOOD PRESSURE: 112 MMHG | BODY MASS INDEX: 24.99 KG/M2 | DIASTOLIC BLOOD PRESSURE: 64 MMHG | WEIGHT: 150 LBS | TEMPERATURE: 98 F | HEIGHT: 65 IN

## 2021-11-03 PROCEDURE — 99215 OFFICE O/P EST HI 40 MIN: CPT

## 2021-11-07 NOTE — ASSESSMENT
[FreeTextEntry1] : Ms. Magallon is a 56 year old woman with a history of cerebral palsy with several issues.\par \par Rash- discussed with PCP. She will follow up with patient. \par \par History of cerebral palsy with spastic right hemiparesis.\par She has had increased spasticity in the right arm and leg which has improved with baclofen 20 mg TID - will continue this.  She has had increased spasticity more recently worsening her gait last visit.  She does not want to consider any of the following options which were presented to her: add Tizandine, botox, intrathecal baclofen trial.\par \par  She has had increased difficulty with her gait and has fallen. Physical medicine and rehabilitation consultation appreciated and physical therapy for gait and balance training and fall prevention. \par  I encouraged her to use an assistive device, at least a cane.  MRI Cervical spine previously reviewed with patient - cervical spondylotic myelopathy not contributing to increased spasticity. \par \par Recurrent episodes of falling- Routine and ambulatory EEG ordered - pt will schedule again. \par MRI Brain (seizure protocol) reviewed with patient.\par PT referral previously. \par Continue follow up with rehab medicine for possible motorized scooter and other assistive devices. \par \par Migraines - Topamax and Butabitol stopped in the past. Unable to tolerate Imitrex. Rizatriptan helps but patient concerned with interactions with Trazodone and increased anxiety and depression.\par Headaches improved with Frova. Continue Frova PRN (may need to choose alternative patient states it is not covered by her insurance). \par  On next visit, we will discuss preventative medications. \par   \par Cervicogenic component of headache- pain management referral last visit.\par \par Nausea- follow up with PCP. PRN Zofran with migraine \par \par \par Dizziness with position change - orthostatic hypotension in office previously- tilt table evaluation. Cardiology referral previously. \par Follow up with PCP.\par \par Family history of cerebral aneurysm - no aneurysm on MRA head in October 2019.\par \par She has always had some mild cognitive impairment but her memory has worsened over the past 4-5 years.\par Neuropsychological evaluation.\par Vitamin B12 level.\par \par MRI - Prominent pituitary gland with increased size differential diagnosis includes pituitary adenoma in October previously. 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 \par \par Intermittent urinary and fecal incontinence for at least one year - urogynecology consult on last visit. Has appointment scheduled. \par \par Follow up in 3 months.\par \par I discussed in detail with the patient the diagnosis, prognosis, treatment plan and answered all of her questions.\par

## 2021-11-07 NOTE — DATA REVIEWED
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 No significant mass effect on the optic chiasm and optic nerves. No evidence of cavernous\par sinus involvement. No significant inferior extension into the sphenoid sinus.\par \par  No enhancing parenchymal lesion identified. No acute infarction, acute intracranial hemorrhage or\par hydrocephalus.\par \par  Redemonstration of marked left cerebral hemisphere volume loss, likely related to prior ischemia.\par \par \par MRI brain reviewed\par MRA head - no aneurysm [de-identified] : 9/7/21\par MRI Cervical Spine\par Multilevel cervical spondylitic changes as detailed above most notable for mild canal stenosis at\par C5-C6 and moderate bilateral foraminal stenosis C5-6 and C6-7\par  Mild increased T2 cord signal at the C5-C6 level within the right posterior lateral cord, which is\par nonspecific and may reflect a small focus of myelomalacia.\par

## 2021-11-07 NOTE — PHYSICAL EXAM
[FreeTextEntry1] : Physical examination \par Rash\par \par Mental status \par Awake, alert, appropriate.  Gives detailed history.\par  \par \par MoCA\par October 2, 2019 - 21/30; memory 2/5\par \par Cranial Nerves \par II: VFF  \par III, IV, VI: PERRL, EOMI.   \par V: Facial sensation is normal B/L.   \par VII: Facial strength is normal B/L. \par \par \par VIII: Gross hearing is intact.   \par \par IX, X: Palate is midline and elevates symmetrically.   \par XI: Trapezius normal strength.   \par XII: Tongue midline without atrophy or fasciculations. \par \par Motor exam  \par Muscle tone - spasticity in the right arm and leg.  Very slow and clumsy rapid successive movements in the right hand.\par No atrophy or fasciculations \par Muscle Strength: arms and legs, proximal and distal flexors and extensors are normal except for right: Finger extension 4-; ankle and toe movement 0; others on the right 4—>4+\par \par \par Reflexes \par Right: 3, brachioradialis and biceps with spread to the finger flexors.\par Right patellar 4+\par Right achilles 4+\par Left 2\par \par Plantars right: Upgoing.   \par Plantars left: mute.   \par \par Slow, no ataxia - FNF, EDDIE, HKS on the right. \par \par \par Gait \par Right spastic, hemiparetic gait with significant difficulty lifting right leg up and dragging her toes on the ground. . Slow, tentative. \par ambulating without cane today.

## 2021-11-13 ENCOUNTER — NON-APPOINTMENT (OUTPATIENT)
Age: 56
End: 2021-11-13

## 2021-11-29 ENCOUNTER — APPOINTMENT (OUTPATIENT)
Dept: NEUROLOGY | Facility: CLINIC | Age: 56
End: 2021-11-29
Payer: MEDICARE

## 2021-11-29 PROCEDURE — 95816 EEG AWAKE AND DROWSY: CPT

## 2021-11-30 ENCOUNTER — APPOINTMENT (OUTPATIENT)
Dept: NEUROLOGY | Facility: CLINIC | Age: 56
End: 2021-11-30

## 2021-11-30 PROCEDURE — 95719 EEG PHYS/QHP EA INCR W/O VID: CPT

## 2021-11-30 PROCEDURE — 95700 EEG CONT REC W/VID EEG TECH: CPT

## 2021-11-30 PROCEDURE — 95708 EEG WO VID EA 12-26HR UNMNTR: CPT

## 2021-12-07 ENCOUNTER — NON-APPOINTMENT (OUTPATIENT)
Age: 56
End: 2021-12-07

## 2022-01-31 ENCOUNTER — RX RENEWAL (OUTPATIENT)
Age: 57
End: 2022-01-31

## 2022-02-07 ENCOUNTER — APPOINTMENT (OUTPATIENT)
Dept: NEUROLOGY | Facility: CLINIC | Age: 57
End: 2022-02-07
Payer: MEDICARE

## 2022-02-07 DIAGNOSIS — G44.86 CERVICOGENIC HEADACHE: ICD-10-CM

## 2022-02-07 DIAGNOSIS — R41.3 OTHER AMNESIA: ICD-10-CM

## 2022-02-07 DIAGNOSIS — E23.6 OTHER DISORDERS OF PITUITARY GLAND: ICD-10-CM

## 2022-02-07 DIAGNOSIS — G43.909 MIGRAINE, UNSPECIFIED, NOT INTRACTABLE, W/OUT STATUS MIGRAINOSUS: ICD-10-CM

## 2022-02-07 DIAGNOSIS — R29.6 REPEATED FALLS: ICD-10-CM

## 2022-02-07 DIAGNOSIS — R26.89 OTHER ABNORMALITIES OF GAIT AND MOBILITY: ICD-10-CM

## 2022-02-07 DIAGNOSIS — G80.9 CEREBRAL PALSY, UNSPECIFIED: ICD-10-CM

## 2022-02-07 DIAGNOSIS — E23.7 DISORDER OF PITUITARY GLAND, UNSPECIFIED: ICD-10-CM

## 2022-02-07 PROCEDURE — 99215 OFFICE O/P EST HI 40 MIN: CPT | Mod: 95

## 2022-02-07 RX ORDER — PREDNISONE 50 MG/1
50 TABLET ORAL
Qty: 5 | Refills: 0 | Status: COMPLETED | COMMUNITY
Start: 2021-11-07

## 2022-02-07 RX ORDER — QUETIAPINE FUMARATE 25 MG/1
25 TABLET ORAL
Qty: 60 | Refills: 0 | Status: COMPLETED | COMMUNITY
Start: 2021-11-14

## 2022-02-07 RX ORDER — MIDODRINE HYDROCHLORIDE 5 MG/1
5 TABLET ORAL 3 TIMES DAILY
Qty: 270 | Refills: 2 | Status: DISCONTINUED | COMMUNITY
Start: 2021-09-28 | End: 2022-02-07

## 2022-02-07 RX ORDER — TRIAMCINOLONE ACETONIDE 1 MG/G
0.1 OINTMENT TOPICAL
Qty: 80 | Refills: 0 | Status: COMPLETED | COMMUNITY
Start: 2021-11-03

## 2022-02-07 RX ORDER — TRIAMCINOLONE ACETONIDE 1 MG/G
0.1 CREAM TOPICAL
Qty: 454 | Refills: 0 | Status: COMPLETED | COMMUNITY
Start: 2021-11-08

## 2022-02-07 RX ORDER — TRAZODONE HYDROCHLORIDE 300 MG/1
TABLET ORAL
Refills: 0 | Status: DISCONTINUED | COMMUNITY
End: 2022-02-07

## 2022-02-07 RX ORDER — FROVATRIPTAN SUCCINATE 2.5 MG/1
TABLET, FILM COATED ORAL
Refills: 0 | Status: DISCONTINUED | COMMUNITY
End: 2022-02-07

## 2022-02-07 RX ORDER — PREDNISONE 10 MG/1
10 TABLET ORAL
Qty: 100 | Refills: 0 | Status: COMPLETED | COMMUNITY
Start: 2021-11-08

## 2022-02-10 PROBLEM — R29.6 FALLS FREQUENTLY: Status: ACTIVE | Noted: 2021-08-26

## 2022-02-10 PROBLEM — G43.909 HEADACHE, MIGRAINE: Status: ACTIVE | Noted: 2019-10-02

## 2022-02-10 PROBLEM — G44.86 CERVICOGENIC HEADACHE: Status: ACTIVE | Noted: 2020-09-01

## 2022-02-10 PROBLEM — E23.7 PITUITARY ABNORMALITY: Status: RESOLVED | Noted: 2020-03-06 | Resolved: 2022-02-10

## 2022-02-10 PROBLEM — G80.9 CEREBRAL PALSY: Status: ACTIVE | Noted: 2019-10-02

## 2022-02-10 PROBLEM — R41.3 MEMORY PROBLEM: Status: RESOLVED | Noted: 2019-10-02 | Resolved: 2022-02-10

## 2022-02-10 PROBLEM — R26.89 BALANCE PROBLEM: Status: ACTIVE | Noted: 2021-01-27

## 2022-02-10 PROBLEM — R41.3 MEMORY IMPAIRMENT: Status: ACTIVE | Noted: 2019-10-02

## 2022-02-10 PROBLEM — E23.6 PITUITARY MASS: Status: ACTIVE | Noted: 2020-09-09

## 2022-02-10 NOTE — DATA REVIEWED
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 No significant mass effect on the optic chiasm and optic nerves. No evidence of cavernous\par sinus involvement. No significant inferior extension into the sphenoid sinus.\par \par  No enhancing parenchymal lesion identified. No acute infarction, acute intracranial hemorrhage or\par hydrocephalus.\par \par  Redemonstration of marked left cerebral hemisphere volume loss, likely related to prior ischemia.\par \par \par MRI brain reviewed\par MRA head - no aneurysm [de-identified] : Feb 2020 - Routine EEG & Ambulatory EEG reviewed [de-identified] : 9/7/21\par MRI Cervical Spine\par Multilevel cervical spondylitic changes as detailed above most notable for mild canal stenosis at\par C5-C6 and moderate bilateral foraminal stenosis C5-6 and C6-7\par  Mild increased T2 cord signal at the C5-C6 level within the right posterior lateral cord, which is\par nonspecific and may reflect a small focus of myelomalacia.\par

## 2022-02-10 NOTE — ASSESSMENT
[FreeTextEntry1] : Ms. Magallon is a 56 year old woman with a history of cerebral palsy with several issues.\par \par \par History of cerebral palsy with spastic right hemiparesis.\par She has had increased spasticity in the right arm and leg which has improved with baclofen 20 mg TID - will continue this. \par \par  She has had increased difficulty with her gait and has fallen.  She was evaluated by physical medicine rehab.  At that time she was requesting a prescription for a rolling walker and continues to request it because it is so hard for her to manipulate the current one she has.  \par \par Recurrent episodes of falling-are likely due to her hemiparesis.  Routine and ambulatory EEG revealed rare left anterior temporal slowing.  There was also one left temporal spike.  The significance of a single spike was unknown at this time.  She is not keen on coming in for EMU monitoring at this time.\par \par Migraines -she declined to start new medications.  She is very scared to try Botox or one of the new CGRP antagonist.  She will think about this.  For rescue she can continue frovatriptan.  We discussed potential overuse component.\par  On next visit, we will discuss preventative medications again.\par   \par Cervicogenic component of headache- pain management referral last visit.\par Family history of cerebral aneurysm - no aneurysm on MRA head in October 2019.\par \par She has always had some mild cognitive impairment but her memory has worsened over the past 4-5 years.\par Neuropsychological evaluation.\par Vitamin B12 level.\par \par MRI - Prominent pituitary gland with increased size differential diagnosis includes pituitary adenoma in October previously.  She will continue to follow-up with endocrine and neurosurgery.  Advised to call Dr. Mathur and make an appointment.\par \par Follow up in 4 months.\par \par I discussed in detail with the patient the diagnosis, prognosis, treatment plan and answered all of her questions.\par

## 2022-02-10 NOTE — HISTORY OF PRESENT ILLNESS
[FreeTextEntry1] : 2/7/22\par This is a 56-year-old woman who is being seen in neurologic consultation for evaluation of multiple complaints.  She previously followed with my colleague Dr. Ledesma.\par She carries a diagnosis of cerebral palsy.  She has baseline mild cognitive impairment and spastic right hemiparesis.  For the hemiparesis, she takes baclofen.\par She also has significant headaches for which she takes Topamax.  Today she is unable to describe her headaches.  Sometimes she will see spots in her vision.  The headache can be unilateral or bilateral.\par \par She lives in a walk up apartment.  She has recently gotten a .  She has an aide now that comes several times a week to help her.  She is unable to walk with a cane due to her hemiparesis.  The walker is very hard to manipulate up and down the stairs as her building does not have an elevator.  She continues to have multiple falls.  She can fall going up and down the stairs.  She has completed physical therapy in the past.\par \par 11/3/21\par She has been taking Midodrine for the past month without side effects. She plans on following up with endocrinology and neurosurgery. \par \par Yesterday she woke up with a generalized rash that spread throughout her body. Endorses pruritus. No fever. No shortness of breath.  Denies any new medications or detergents. She did not take any medication for relief. \par \par She continues to have headaches due to construction next to her window causing sleeping difficulties. She had three to four episodes of her "legs giving out" with no loss of consciousness and shaking. Ms. Magallon is able to recall the events. She did not schedule the EEG previously ordered. Currently, ambulating without a cane. \par \par Ms. Magallon has generalized right sided pain and took her baclofen at 9am this morning. \par \par The remaining neurological review of systems is negative. \par \par 9/22/21 \par Kavita Magallon is a 56 year old woman with cerebral palsy presenting for a follow up appointment for frequent falls.\par \par Ms. Magallon endorses she has had multiple falls without injury due to her "legs collapsing." She feels her cerebral palsy is worse and is currently taking Baclofen 20mg three times daily. She endorses her right leg drags and "feels like a block of wood." No tongue biting noted during any of these falls. Ms. Magallon did not have the EEG done that was previously ordered. She is also seeing the cardiologist. \par \par She endorses an increased amount of stress at home due to her living situation. Plans on having an evaluation for a home health aide on October 26th. Currently, she lives in a building with stairs no elevator. Ms. Magallon plans on following up with her  for alternate arrangements. She has difficulty sleeping and is seeing psychiatry and was given Trazodone.  She also reports her short term recall is worse.\par \par The remaining neurological review of systems is negative.  \par \par 9/1/21\par Kavita Magallon is a 56 year old woman with cerebral palsy presenting for a follow up appointment for frequent falls.\par \par Ms. Magallon endorses she had two episodes of falling due to her "legs collapsing." On 8/16 she had a fall backwards, no head injury where her knees were buckling. On 8/23 she fell again while getting food out of the fridge. She was incontinent of urine at that time. Her boyfriend witnessed jerking of the extremities. She denies loss of consciousness. Was alert and aware of event and felt fatigued afterwards. Ms. Magallon had a seizure in 2013 that manifested as word finding difficulties and falling onto the sofa.  FHx - mother - epilepsy. Denies any head injuries or MVA. Denies visual changes, weakness or numbness on one side of the body, change in speech or facial droop. \par \par Prior to these episodes she reports sleeping for three days continuously and increased fatigue. She reports a feeling of dizziness described as lightheadedness that is intermittent and does not occur with position change as previously reported. She is seeing cardiology tomorrow. She continues to have headaches and is taking Frova as needed for relief. She cannot recall if she tried the Ketorolac. Ms. Magallon reports an increased pressure sensation in her head when bending downward. \par \par Ms. Magallon endorses a worsening of her short term memory with delayed recall. She can manage her own medications and bills at home. She can also manage her own appointments. \par \par No change in medications.\par \par Has straining with urination for 2-3 weeks and hesitancy with constipation (following up with urogynecologist scheduled by patient). \par \par She reports 5-6 falls in total and is seeing Dr. Lopez as well. She does not have physical therapy at home. \par \par The remaining neurological review of systems is negative. \par \par 7/16/21\par Kavita Magallon is a 55 year old woman with cerebral palsy presenting for a follow up appointment for headaches and gait difficulties.\par \par She endorses an increased amount of stress due to possible eviction and relationship difficulties. The increased stress has caused her to have daily headaches for two weeks radiating up from her neck to her forehead and bilateral temples. Positive photophobia and nausea. Denies vomiting. She endorses taking the Frova with Advil and no relief. \par \par Her walker is heavy and she has difficulty ambulating up the stairs with it. She did not do the MRIs or follow up with the physiatrist yet but plans on doing it. \par \par She is currently taking Baclofen 20mg three times daily without side effects. \par \par The remaining neurological review of systems is negative. \par \par 6/3/21\par Kavita Magallon is a 55 year old woman with cerebral palsy presenting for a follow up appointment for headaches and gait difficulties.\par \par She endorses her walker is heavy and she is falling while ambulating up and down the stairs. She did not complete the MRIs previously ordered. She is planning on moving in January to [place]. Her headaches are much improved. \par \par The remaining neurological review of systems is negative. \par \par 4/19/21\par Kavita Magallon is a 55 year old woman with cerebral palsy presenting for a follow up appointment for worsening headaches. \par \par She endorses she has had worsening anxiety and depression since her last visit. Her psychiatrist discontinued Paxil (stopped one month ago) and she is now taking Trazodone. She has migraines most of the day and Rizatriptan did help but she feels it has worsened her anxiety and depression. \par She tried Topamax, Butabitol,  and Imitrex in the past. \par \par Ms. Magallon had physical therapy and has a right leg brace. \par \par She had her COVID-19 vaccine 1st dose of Moderna two weeks ago and second dose due on 5/7. \par \par The remaining neurological review of systems is negative. \par \par 1/27/21\par Kavita Magallon is a 55 year old woman with cerebral palsy presenting for a follow up appointment for worsening balance, headaches, and dizziness. \par \par She endorses she feels dizzy and off balance with changing positions and this has worsened over the past three to four days. She feels an increase in lightheadedness when getting out of bed in the morning. She has not had a syncopal episode but feels as if she "may pass out."  Ms. Magallon has not had any recent falls. \par \par Her last seizure was in 2013 and she was unable to get the words out then 3.5 hours later she is back to baseline.\par \par She endorses her headaches are under control with Rizatriptan.\par \par The remaining neurological review of systems is negative. \par \par 10/27/20\par Kavita Magallon is a 55 year old woman with cerebral palsy presenting for a follow up for worsening balance and headaches. \par \par She has had headaches five times per week that last all day. She reports the Imitrex helps but it makes her feel fatigued and sleepy.   Ms. Magallon endorses that the pain starts in the occipital and neck area and radiating to the frontal and bitemporal. It is 10/10 on a pain scale and feels as if  a "knife is through her temple."\par \par She also reports her balance is worse. Physical therapy was ordered for her previously and she starts it on September 9th.  She feels dizzy and lightheaded at times. There have been no falls or injuries. She has 36 stairs in her apartment and plans on seeing rehab medicine on September 10th.  \par She does not go outside of the house much for a fear of falling.  \par \par Ms. Magallon has had nausea for 4-5 days constant with no change in medication. She has not followed up with her PCP yet for this. \par \par Ms. Magallon has not followed up with the behavioral health center yet for increased stress and anxiety.\par \par Currently, she is on baclofen 15mg three times per day. Her spasms have improved on the right side. She denies any side effects with the baclofen. \par \par She reports ongoing difficulty with her memory and has a planned neuropsychological evaluation. She did not get her B12 level done yet. \par \par The remaining neurological review of systems is negative. \par \par 7/27/20\par Kavita Magallon is a 55 year old woman with cerebral palsy. She presents today for worsening balance and stiffness in the right leg, as well as headaches.\par \par She currently lives on the third floor with no elevator.  She does not use a cane or assistive device to help her walk. Over the past four months, Ms. Magallon reports a change in balance, increase in difficulty walking and stiffness to her right leg.  She is currently taking Baclofen 10mg three times per day with no side effects. Baclofen 15mg three times per day was recommended on her last visit.  There is pain in her right leg from the hip throughout the whole leg. She reports the pain is difficult to describe but it feels as "if somebody is hitting her leg."  \par \par Ms. Magallon also reports a worsening of her preexisting headaches. She was on Topamax 100mg nightly that was tapered down by her primary doctor - currently, she reports taking 12.5mg of Topamax nightly.  She is not certain of the increased in the amount of frequency but is aware that the headaches increased overall.  The pain starts in the occipital area and travels to the bilateral frontal area of her head.  She has sound sensitivity and describes it as a "device" on her head.  She denies photophobia, nausea, vomiting.  Ms. Magallon has tried Ibuprofen and Naproxen in the past with no relief. She was given butalbital by another physician.  This has not provided her with any relief.  \par \par She reports intermittent urinary and fecal incontinence for at least one year. \par \par Ms. Magallon also reports worsening memory over 4-5 years.  She was unable to do neuropsychological testing due to insurance issues.  \par \par The remaining neurological review of systems is negative. \par \par 3/6/20: This is a followup visit for Ms. Magallon. She continues to have lightheadedness. She is worsening stiffness in her right side with multiple falls. She used to have a power chair. Now she is not using a cane or walker. No change in any of her other symptoms\par \par Initial consultation:\par Ms. Magallon is a 54 year old woman with a history of cerebral palsy with baseline mild cognitive impairment and spastic right hemiparesis. She has noticed gradually worsening cognitive and memory problems for the past 4-5 years. At the current time she forgets things that she was told 5 minutes before. She works as a  at “[employer]" and is able to perform her duties there although her manager has noticed the memory difficulty.  She has always had right-sided weakness and stiffness but there has been an increased amount of stiffness in the right side more recently. She takes baclofen 10 mg t.i.d. Her right foot is dragging and she sometimes trips on her toes on the right. She has had multiple falls. She has no history of epilepsy. She has a history of headache which was very well controlled on Topamax 75 mg at night. More recently the headaches have increased in frequency again.  Her mother has a history of cerebral aneurysm.

## 2022-02-24 ENCOUNTER — APPOINTMENT (OUTPATIENT)
Dept: ENDOCRINOLOGY | Facility: CLINIC | Age: 57
End: 2022-02-24
Payer: MEDICARE

## 2022-02-24 DIAGNOSIS — E23.6 OTHER DISORDERS OF PITUITARY GLAND: ICD-10-CM

## 2022-02-24 PROCEDURE — 99204 OFFICE O/P NEW MOD 45 MIN: CPT | Mod: 95

## 2022-03-03 PROBLEM — E23.6 ENLARGED PITUITARY GLAND: Status: ACTIVE | Noted: 2022-02-24

## 2022-03-03 NOTE — HISTORY OF PRESENT ILLNESS
[Home] : at home, [unfilled] , at the time of the visit. [Other Location: e.g. 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

## 2022-04-13 ENCOUNTER — APPOINTMENT (OUTPATIENT)
Dept: VASCULAR SURGERY | Facility: CLINIC | Age: 57
End: 2022-04-13

## 2022-08-02 ENCOUNTER — APPOINTMENT (OUTPATIENT)
Dept: PULMONOLOGY | Facility: CLINIC | Age: 57
End: 2022-08-02

## 2022-08-02 ENCOUNTER — NON-APPOINTMENT (OUTPATIENT)
Age: 57
End: 2022-08-02

## 2022-09-13 RX ORDER — BACLOFEN 20 MG/1
20 TABLET ORAL
Qty: 90 | Refills: 5 | Status: ACTIVE | COMMUNITY
Start: 2022-09-13 | End: 1900-01-01

## 2022-10-11 ENCOUNTER — RX RENEWAL (OUTPATIENT)
Age: 57
End: 2022-10-11

## 2022-10-11 RX ORDER — FROVATRIPTAN SUCCINATE 2.5 MG/1
2.5 TABLET, FILM COATED ORAL
Qty: 18 | Refills: 0 | Status: ACTIVE | COMMUNITY
Start: 2021-04-19 | End: 1900-01-01

## 2022-12-06 ENCOUNTER — APPOINTMENT (OUTPATIENT)
Dept: NEUROLOGY | Facility: CLINIC | Age: 57
End: 2022-12-06